# Patient Record
Sex: FEMALE | Race: WHITE | Employment: OTHER | URBAN - METROPOLITAN AREA
[De-identification: names, ages, dates, MRNs, and addresses within clinical notes are randomized per-mention and may not be internally consistent; named-entity substitution may affect disease eponyms.]

---

## 2017-02-23 ENCOUNTER — APPOINTMENT (OUTPATIENT)
Dept: LAB | Age: 72
End: 2017-02-23
Attending: FAMILY MEDICINE
Payer: MEDICARE

## 2017-02-23 DIAGNOSIS — R79.89 LOW SERUM SODIUM: ICD-10-CM

## 2017-02-23 LAB — SODIUM SERPL-SCNC: 134 MMOL/L (ref 136–144)

## 2017-02-23 PROCEDURE — 84295 ASSAY OF SERUM SODIUM: CPT

## 2017-02-23 PROCEDURE — 36415 COLL VENOUS BLD VENIPUNCTURE: CPT

## 2017-02-24 DIAGNOSIS — E87.1 LOW SODIUM LEVELS: Primary | ICD-10-CM

## 2017-05-09 ENCOUNTER — APPOINTMENT (OUTPATIENT)
Dept: LAB | Age: 72
End: 2017-05-09
Attending: FAMILY MEDICINE
Payer: MEDICARE

## 2017-05-09 ENCOUNTER — OFFICE VISIT (OUTPATIENT)
Dept: FAMILY MEDICINE CLINIC | Facility: CLINIC | Age: 72
End: 2017-05-09

## 2017-05-09 VITALS
HEIGHT: 64 IN | BODY MASS INDEX: 32.61 KG/M2 | DIASTOLIC BLOOD PRESSURE: 80 MMHG | HEART RATE: 80 BPM | SYSTOLIC BLOOD PRESSURE: 132 MMHG | RESPIRATION RATE: 16 BRPM | TEMPERATURE: 98 F | WEIGHT: 191 LBS

## 2017-05-09 DIAGNOSIS — I10 BENIGN ESSENTIAL HYPERTENSION: Primary | ICD-10-CM

## 2017-05-09 DIAGNOSIS — E87.1 LOW SODIUM LEVELS: ICD-10-CM

## 2017-05-09 DIAGNOSIS — F43.0 ACUTE STRESS REACTION: ICD-10-CM

## 2017-05-09 DIAGNOSIS — E55.9 VITAMIN D DEFICIENCY: ICD-10-CM

## 2017-05-09 PROCEDURE — 99213 OFFICE O/P EST LOW 20 MIN: CPT | Performed by: FAMILY MEDICINE

## 2017-05-09 PROCEDURE — 36415 COLL VENOUS BLD VENIPUNCTURE: CPT

## 2017-05-09 PROCEDURE — 84295 ASSAY OF SERUM SODIUM: CPT

## 2017-05-09 RX ORDER — ESCITALOPRAM OXALATE 10 MG/1
10 TABLET ORAL
Qty: 90 TABLET | Refills: 1 | Status: SHIPPED | OUTPATIENT
Start: 2017-05-09 | End: 2017-12-11

## 2017-05-09 RX ORDER — HYDROCHLOROTHIAZIDE 25 MG/1
25 TABLET ORAL
Qty: 90 TABLET | Refills: 1 | Status: SHIPPED | OUTPATIENT
Start: 2017-05-09 | End: 2017-10-30

## 2017-05-09 RX ORDER — BISOPROLOL FUMARATE AND HYDROCHLOROTHIAZIDE 6.25; 5 MG/1; MG/1
1 TABLET ORAL
Qty: 90 TABLET | Refills: 1 | Status: SHIPPED | OUTPATIENT
Start: 2017-05-09 | End: 2017-10-30

## 2017-05-09 NOTE — PROGRESS NOTES
CHIEF COMPLAINT:   Monica Camejo a 73year old female is here for followup on HTN  HPI:   Monica Camejo has been doing well since the last visit here. Monica Camejo  is compliant with hypertensive medication. No chest pain. No dyspnea.  No pal elevated blood pressures noted while monitoring at home.

## 2017-06-22 ENCOUNTER — HOSPITAL ENCOUNTER (OUTPATIENT)
Age: 72
Discharge: HOME | End: 2017-06-22
Attending: EMERGENCY MEDICINE
Payer: MEDICARE

## 2017-06-22 VITALS
DIASTOLIC BLOOD PRESSURE: 87 MMHG | SYSTOLIC BLOOD PRESSURE: 140 MMHG | HEART RATE: 68 BPM | RESPIRATION RATE: 18 BRPM | TEMPERATURE: 98 F | OXYGEN SATURATION: 98 %

## 2017-06-22 DIAGNOSIS — M25.511 ACUTE PAIN OF RIGHT SHOULDER: Primary | ICD-10-CM

## 2017-06-22 PROCEDURE — 99203 OFFICE O/P NEW LOW 30 MIN: CPT

## 2017-06-22 PROCEDURE — 99213 OFFICE O/P EST LOW 20 MIN: CPT

## 2017-06-22 RX ORDER — HYDROCODONE BITARTRATE AND ACETAMINOPHEN 5; 325 MG/1; MG/1
1-2 TABLET ORAL EVERY 6 HOURS PRN
Qty: 20 TABLET | Refills: 0 | Status: SHIPPED | OUTPATIENT
Start: 2017-06-22 | End: 2017-07-02

## 2017-06-22 NOTE — ED INITIAL ASSESSMENT (HPI)
Right shoulder discomfort for 3 weeks  Patient states moving furniture and boxes- believes that started the discomfort  Decreased ROM

## 2017-06-22 NOTE — ED PROVIDER NOTES
Patient Seen in: [facility]    History   Patient presents with:  Shoulder Pain    Stated Complaint: r shoulder pain     HPI    Patient complains of shoulder pain.   Shoulder pain is been present for about 3 weeks but became much DAILY   Multiple Vitamin (DAILY MULTIVITAMIN OR),  Take  by mouth. Aspirin 81 MG Oral Tab,  Take 81 mg by mouth daily. Calcium Carbonate-Vitamin D (CALCIUM + D OR),  Take  by mouth.        Family History   Problem Relation Age of Onset   • Other[other] hand is excellent. Sensation intact light touch. Radial pulses are strong. Distal cap refill intact.        ED Course   Labs Reviewed - No data to display    MDM   Patient has significant discomfort with abduction and external rotation of the shoulder so

## 2017-06-26 PROBLEM — M75.81 ROTATOR CUFF TENDONITIS, RIGHT: Status: ACTIVE | Noted: 2017-06-26

## 2017-08-03 ENCOUNTER — TELEPHONE (OUTPATIENT)
Dept: FAMILY MEDICINE CLINIC | Facility: CLINIC | Age: 72
End: 2017-08-03

## 2017-10-05 RX ORDER — BISOPROLOL FUMARATE AND HYDROCHLOROTHIAZIDE 6.25; 5 MG/1; MG/1
TABLET ORAL
Qty: 30 TABLET | Refills: 0 | Status: SHIPPED | OUTPATIENT
Start: 2017-10-05 | End: 2017-10-30

## 2017-10-19 ENCOUNTER — TELEPHONE (OUTPATIENT)
Dept: FAMILY MEDICINE CLINIC | Facility: CLINIC | Age: 72
End: 2017-10-19

## 2017-10-19 NOTE — TELEPHONE ENCOUNTER
Received form from [employer] [facility] requesting PA information for pt's Bisoprolol Fumarate/ hydrochlorothiazide. Form completed and faxed.

## 2017-10-24 NOTE — TELEPHONE ENCOUNTER
Incoming fax received from department of health and Human Services [facility] of Medicaid and [facility] [employer]. Letter of Determination. Medication denied coverage. Formulary alternative is Atenolol -Chlorthalidone.   Patient has upcoming appointment 10/30

## 2017-10-30 ENCOUNTER — OFFICE VISIT (OUTPATIENT)
Dept: FAMILY MEDICINE CLINIC | Facility: CLINIC | Age: 72
End: 2017-10-30

## 2017-10-30 ENCOUNTER — LAB ENCOUNTER (OUTPATIENT)
Dept: LAB | Age: 72
End: 2017-10-30
Attending: FAMILY MEDICINE
Payer: MEDICARE

## 2017-10-30 VITALS
RESPIRATION RATE: 12 BRPM | HEART RATE: 64 BPM | HEIGHT: 64 IN | TEMPERATURE: 99 F | DIASTOLIC BLOOD PRESSURE: 86 MMHG | WEIGHT: 197 LBS | SYSTOLIC BLOOD PRESSURE: 140 MMHG | BODY MASS INDEX: 33.63 KG/M2

## 2017-10-30 DIAGNOSIS — E78.00 HYPERCHOLESTEREMIA: ICD-10-CM

## 2017-10-30 DIAGNOSIS — Z87.448 HISTORY OF HEMATURIA: ICD-10-CM

## 2017-10-30 DIAGNOSIS — I10 BENIGN ESSENTIAL HYPERTENSION: ICD-10-CM

## 2017-10-30 DIAGNOSIS — I83.893 VARICOSE VEINS OF BOTH LEGS WITH EDEMA: ICD-10-CM

## 2017-10-30 DIAGNOSIS — Z12.39 SCREENING FOR MALIGNANT NEOPLASM OF BREAST: ICD-10-CM

## 2017-10-30 DIAGNOSIS — E55.9 VITAMIN D DEFICIENCY: ICD-10-CM

## 2017-10-30 DIAGNOSIS — Z00.00 ENCOUNTER FOR ANNUAL HEALTH EXAMINATION: Primary | ICD-10-CM

## 2017-10-30 DIAGNOSIS — Z13.0 SCREENING FOR IRON DEFICIENCY ANEMIA: ICD-10-CM

## 2017-10-30 PROCEDURE — 80061 LIPID PANEL: CPT

## 2017-10-30 PROCEDURE — 80053 COMPREHEN METABOLIC PANEL: CPT

## 2017-10-30 PROCEDURE — 84443 ASSAY THYROID STIM HORMONE: CPT

## 2017-10-30 PROCEDURE — 99214 OFFICE O/P EST MOD 30 MIN: CPT | Performed by: FAMILY MEDICINE

## 2017-10-30 PROCEDURE — 81001 URINALYSIS AUTO W/SCOPE: CPT

## 2017-10-30 PROCEDURE — 36415 COLL VENOUS BLD VENIPUNCTURE: CPT

## 2017-10-30 PROCEDURE — 85025 COMPLETE CBC W/AUTO DIFF WBC: CPT

## 2017-10-30 PROCEDURE — G0403 EKG FOR INITIAL PREVENT EXAM: HCPCS | Performed by: FAMILY MEDICINE

## 2017-10-30 PROCEDURE — 82306 VITAMIN D 25 HYDROXY: CPT

## 2017-10-30 PROCEDURE — G0402 INITIAL PREVENTIVE EXAM: HCPCS | Performed by: FAMILY MEDICINE

## 2017-10-30 RX ORDER — BISOPROLOL FUMARATE 5 MG/1
5 TABLET ORAL DAILY
Qty: 90 TABLET | Refills: 1 | Status: SHIPPED | OUTPATIENT
Start: 2017-10-30 | End: 2018-04-19

## 2017-10-30 RX ORDER — HYDROCHLOROTHIAZIDE 25 MG/1
25 TABLET ORAL
Qty: 90 TABLET | Refills: 1 | Status: SHIPPED | OUTPATIENT
Start: 2017-10-30 | End: 2017-12-13

## 2017-10-30 NOTE — PATIENT INSTRUCTIONS
Stop the bisoprolol/HCTZ combination - start plain bisoprolol 5mg daily. Continue HCTZ 25mg daily. Follow up with home monitor and readings in 4-6 weeks for recheck of BP.       Adelina Thomas's SCREENING SCHEDULE   Tests on this list are recommended b and non-screening if indicated for medical reasons Electrocardiogram date Routine EKG is not a screening covered service except at the [place] to Medicare Visit    Abdominal aortic aneurysm screening (once between ages 73-68) IPPE only No results found for for this patient. Chlamydia  Annually if high risk No results found for: CHLAMYDIA No flowsheet data found.     Screening Mammogram      Mammogram    Recommend Annually to at least age 76, and as needed after 76 Mammogram,1 Yr due on 11/18/2017 Please g different types of Advance Directives. It also has the State forms available on it's website for anyone to review and print using their home computer and printer. (the forms are also available in [address])  [email or web address]  This link also has informa Date Value   10/22/2013 87   10/04/2011 90     Triglycerides (mg/dL)   Date Value   10/27/2016 98        EKG - covered if needed at Welcome to Medicare, and non-screening if indicated for medical reasons Electrocardiogram date Routine EKG is not a screen age 21-68 or Pap+HPV every 5 yrs age 33-67, Covered every 2 yrs up to age 79 or Yearly if High Risk   There are no preventive care reminders to display for this patient.      Chlamydia  Annually if high risk No results found for: CHLAMYDIA No flowsheet data http://www. idph.state. [place]/public/books/advin.htm  A link to the [employer]. This site has a lot of good information including definitions of the different types of Advance Directives.  It also has the State forms available on it's webs

## 2017-10-30 NOTE — PROGRESS NOTES
HPI:   Erin Mensah is a 81year old female who presents for a Medicare Initial Preventative Physical Exam (Welcome to Medicare- < 12 months on Medicare). Compliant with medications for HTN. Insurance won't cover Bisoprolol/HCTZ combo.  No chest Outpatient Prescriptions Marked as Taking for the 10/30/17 encounter (Office Visit) with Kelvin Guerrero PA-C:  Bisoprolol Fumarate 5 MG Oral Tab Take 1 tablet (5 mg total) by mouth daily.    hydrochlorothiazide 25 MG Oral Tab Take 1 tablet (25 mg history  ALL/ASTHMA: denies hx of allergy or asthma    EXAM:   /86   Pulse 64   Temp 99.4 °F (37.4 °C) (Oral)   Resp 12   Ht 64\"   Wt 197 lb   LMP 09/25/1998   BMI 33.81 kg/m²  Estimated body mass index is 33.81 kg/m² as calculated from the followin bleeding, No axillary or supraclavicular adenopathy      SUGGESTED VACCINATIONS - Influenza, Pneumococcal, Zoster, Tetanus     Immunization History   Administered Date(s) Administered   • HIGH DOSE FLU 65 YRS AND OLDER PRSV FREE SINGLE D (20305) FLU CLINIC does not have a Power of  for [employer] on file in [address] [place] [address] Rd.  The patient has this document but we do not have it in Epic, and patient is instructed to get our office a copy of it for scanning into Epic           PLAN:  The patient indicates [place] 1-Yes    Does pain affect your day to day activities?: 0-No     Have you had any memory issues?: 0-No    Fall/Risk Scorin          Depression Screening (PHQ-2/PHQ-9): Over the LAST 2 WEEKS   Little interest or pleasure in doing things (over the last tw Annually No results found for: FOB No flowsheet data found. Glaucoma Screening      Ophthalmology Visit Annually: Diabetics, FHx Glaucoma, AA>50, > 65 No flowsheet data found.     Bone Density Screening      Dexascan Every two years Last Dexa Sca Persistent     Medications (ACE/ARB, digoxin diuretics, anticonvulsants.)    Potassium  Annually Potassium (mmol/L)   Date Value   10/30/2017 3.9     POTASSIUM (mmol/L)   Date Value   10/22/2013 4.5   09/25/2012 3.8   10/04/2011 3.6    No flowsheet data fo Medicare Patient With [address] for this Visit:  Signed Prescriptions Disp Refills    Bisoprolol Fumarate 5 MG Oral Tab 90 tablet 1      Sig: Take 1 tablet (5 mg total) by mouth daily.       hydrochlorothiazide 25 MG Oral Tab 90 tablet 1      Si

## 2017-11-01 ENCOUNTER — TELEPHONE (OUTPATIENT)
Dept: FAMILY MEDICINE CLINIC | Facility: CLINIC | Age: 72
End: 2017-11-01

## 2017-11-01 DIAGNOSIS — R79.89 ABNORMAL CBC: Primary | ICD-10-CM

## 2017-11-03 ENCOUNTER — MED REC SCAN ONLY (OUTPATIENT)
Dept: FAMILY MEDICINE CLINIC | Facility: CLINIC | Age: 72
End: 2017-11-03

## 2017-11-06 ENCOUNTER — HOSPITAL ENCOUNTER (OUTPATIENT)
Dept: ULTRASOUND IMAGING | Facility: HOSPITAL | Age: 72
Discharge: HOME | End: 2017-11-06
Attending: FAMILY MEDICINE
Payer: MEDICARE

## 2017-11-06 ENCOUNTER — TELEPHONE (OUTPATIENT)
Dept: FAMILY MEDICINE CLINIC | Facility: CLINIC | Age: 72
End: 2017-11-06

## 2017-11-06 DIAGNOSIS — M79.662 PAIN OF LEFT CALF: ICD-10-CM

## 2017-11-06 DIAGNOSIS — M79.662 PAIN OF LEFT CALF: Primary | ICD-10-CM

## 2017-11-06 PROCEDURE — 93971 EXTREMITY STUDY: CPT | Performed by: FAMILY MEDICINE

## 2017-11-06 NOTE — TELEPHONE ENCOUNTER
KURTIS From pt. She saw you on 10/30/2017. During your visit she mentioned left calf and ankle swelling. She has had these symptoms for several years off/on. She was advised to see a vein specialist but they can not see her until 12/04/2017.  Since her sympto

## 2017-11-06 NOTE — TELEPHONE ENCOUNTER
When we spoke about it she indicated it was chronic - her left always worse then the right leg (especially if she has salt the day before).   If she is noticing more swelling than normal for her or if she feels like salt really doesn't make a difference now

## 2017-11-06 NOTE — TELEPHONE ENCOUNTER
T.C. To pt. She did clarify she does have an increase in swelling in her left calf and some pain. S.Dressler advised pt to have a stat Venous. U/S today. appt for today at 4:45 pm. Pt notified and agreed to plan.

## 2017-11-21 ENCOUNTER — HOSPITAL ENCOUNTER (OUTPATIENT)
Dept: MAMMOGRAPHY | Age: 72
Discharge: HOME | End: 2017-11-21
Attending: FAMILY MEDICINE
Payer: MEDICARE

## 2017-11-21 DIAGNOSIS — Z12.39 SCREENING FOR MALIGNANT NEOPLASM OF BREAST: ICD-10-CM

## 2017-11-21 PROCEDURE — 77067 SCR MAMMO BI INCL CAD: CPT | Performed by: FAMILY MEDICINE

## 2017-11-28 ENCOUNTER — TELEPHONE (OUTPATIENT)
Dept: FAMILY MEDICINE CLINIC | Facility: CLINIC | Age: 72
End: 2017-11-28

## 2017-11-28 NOTE — TELEPHONE ENCOUNTER
Left message on the pharmacist voicemail stating to discontinue patient's Bisoprolol/HCTZ 5 mg/6.25 mg refill requests. Informed them that patient is taking them separately now.

## 2017-12-04 PROBLEM — M79.89 RIGHT LEG SWELLING: Status: ACTIVE | Noted: 2017-12-04

## 2017-12-04 PROBLEM — M79.89 LEFT LEG SWELLING: Status: ACTIVE | Noted: 2017-12-04

## 2017-12-04 PROBLEM — I80.02: Status: ACTIVE | Noted: 2017-12-04

## 2017-12-11 ENCOUNTER — PATIENT MESSAGE (OUTPATIENT)
Dept: FAMILY MEDICINE CLINIC | Facility: CLINIC | Age: 72
End: 2017-12-11

## 2017-12-12 NOTE — TELEPHONE ENCOUNTER
From: Deyvi Ku  To: Leonid Carter  Sent: 12/11/2017 1:08 AM CST  Subject: Prescription Question    Hi Aleyda:  Could you please refill my prescription for the escitalopram tabs, 10 mg, as soon as possible?  I went to [facility] [address]

## 2017-12-13 RX ORDER — ESCITALOPRAM OXALATE 10 MG/1
10 TABLET ORAL
Qty: 90 TABLET | Refills: 0 | Status: SHIPPED
Start: 2017-12-13 | End: 2018-03-06

## 2017-12-20 RX ORDER — ESCITALOPRAM OXALATE 10 MG/1
10 TABLET ORAL
Qty: 90 TABLET | Refills: 1
Start: 2017-12-20

## 2017-12-20 RX ORDER — HYDROCHLOROTHIAZIDE 25 MG/1
25 TABLET ORAL
Qty: 90 TABLET | Refills: 1 | Status: SHIPPED
Start: 2017-12-20 | End: 2018-04-19

## 2017-12-20 NOTE — TELEPHONE ENCOUNTER
From: Jose Juan Vargas  Sent: 12/11/2017 1:01 AM CST  Subject: Medication Renewal Request    Jose Juan Vargas would like a refill of the following medications:     escitalopram 10 MG Oral Tab Mae Herrera PA-C]    [facility]

## 2017-12-20 NOTE — TELEPHONE ENCOUNTER
From: Erin Mensah  Sent: 12/13/2017 6:17 PM CST  Subject: Medication Renewal Request    Erin Mensah would like a refill of the following medications:     hydrochlorothiazide 25 MG Oral Tab Nicole Mosqueda PA-C]    Preferred pharmacy: [facility]

## 2017-12-20 NOTE — TELEPHONE ENCOUNTER
From: Lanice Check  Sent: 12/13/2017 10:41 AM CST  Subject: Medication Renewal Request    Lanice Check would like a refill of the following medications:     escitalopram 10 MG Oral Tab Arch Sacks, PA-C]    Preferred pharmacy: [facility]

## 2018-03-08 RX ORDER — ESCITALOPRAM OXALATE 10 MG/1
TABLET ORAL
Qty: 90 TABLET | Refills: 0 | Status: SHIPPED | OUTPATIENT
Start: 2018-03-08 | End: 2018-04-19

## 2018-03-09 RX ORDER — ESCITALOPRAM OXALATE 10 MG/1
10 TABLET ORAL
Qty: 90 TABLET | Refills: 0
Start: 2018-03-09

## 2018-03-09 NOTE — TELEPHONE ENCOUNTER
From: Milton Hinojosa  Sent: 3/3/2018 6:53 PM CST  Subject: Medication Renewal Request    Milton Hinojosa would like a refill of the following medications:     escitalopram 10 MG Oral Tab Darion Hartley PA-C]    [facility]

## 2018-04-19 ENCOUNTER — LAB ENCOUNTER (OUTPATIENT)
Dept: LAB | Age: 73
End: 2018-04-19
Attending: FAMILY MEDICINE
Payer: MEDICARE

## 2018-04-19 DIAGNOSIS — R79.89 ABNORMAL CBC: ICD-10-CM

## 2018-04-19 PROBLEM — M79.89 LEFT LEG SWELLING: Status: RESOLVED | Noted: 2017-12-04 | Resolved: 2018-04-19

## 2018-04-19 PROBLEM — M79.89 RIGHT LEG SWELLING: Status: RESOLVED | Noted: 2017-12-04 | Resolved: 2018-04-19

## 2018-04-19 PROCEDURE — 36415 COLL VENOUS BLD VENIPUNCTURE: CPT

## 2018-04-19 PROCEDURE — 85025 COMPLETE CBC W/AUTO DIFF WBC: CPT

## 2018-04-19 NOTE — PROGRESS NOTES
CHIEF COMPLAINT:   Az Olivares a 81year old female is here for followup on HTN and stress reaction  HPI:   Az Olivares has been doing well since the last visit here. Az Olivares  is compliant with hypertensive medication.   No chest pain total) by mouth daily. Discussed weaning of lexapro - ok to try. Continue current management. Continue to monitor blood pressure. Continue regular exercise. Watch salt intake. Followup in 6 months.   Sooner if any elevated blood pressures not

## 2018-04-19 NOTE — PATIENT INSTRUCTIONS
Debrox for 4 days prior to your next appointment. Bring your blood pressure monitor to next appointment.

## 2018-05-01 ENCOUNTER — OFFICE VISIT (OUTPATIENT)
Dept: FAMILY MEDICINE CLINIC | Facility: CLINIC | Age: 73
End: 2018-05-01

## 2018-05-01 VITALS
SYSTOLIC BLOOD PRESSURE: 110 MMHG | RESPIRATION RATE: 12 BRPM | DIASTOLIC BLOOD PRESSURE: 70 MMHG | HEART RATE: 72 BPM | TEMPERATURE: 98 F

## 2018-05-01 DIAGNOSIS — Z09 RESOLVED CONDITION, FOLLOW-UP: ICD-10-CM

## 2018-05-01 DIAGNOSIS — R09.82 POST-NASAL DRIP: Primary | ICD-10-CM

## 2018-05-01 PROCEDURE — 99213 OFFICE O/P EST LOW 20 MIN: CPT | Performed by: FAMILY MEDICINE

## 2018-05-01 NOTE — PROGRESS NOTES
Alma Willett is a 81year old female. CHIEF COMPLAINT   Check ear and also PND. HPI:   PND, chronic nasal drainage, sore throat off and on. No med tried. Follow up on ear - check right ear - used Debrox.      Current Outpatient Prescriptions:  G denies any unusual skin lesions or rashes  RESPIRATORY: denies shortness of breath with exertion  CARDIOVASCULAR: denies chest pain on exertion  GI: denies abdominal pain and denies heartburn  NEURO: denies headaches    EXAM:   /70   Pulse 72   Temp

## 2018-05-01 NOTE — PATIENT INSTRUCTIONS
Claritin (loratadine) 10mg once daily OR  Allegra (fexofenadine) 180mg once daily OR Zyrtec (cetirizine) 10mg once daily. Claritin is the weakest.  Zyrtec is the strongest.  Zyrtec in some patients will cause mild sedation.   Nothing with a D on the end of

## 2018-08-03 ENCOUNTER — TELEPHONE (OUTPATIENT)
Dept: FAMILY MEDICINE CLINIC | Facility: CLINIC | Age: 73
End: 2018-08-03

## 2018-09-10 ENCOUNTER — CHARTING TRANS (OUTPATIENT)
Dept: OTHER | Age: 73
End: 2018-09-10

## 2018-10-11 RX ORDER — BISOPROLOL FUMARATE 5 MG/1
TABLET ORAL
Qty: 90 TABLET | Refills: 0 | Status: SHIPPED | OUTPATIENT
Start: 2018-10-11 | End: 2018-11-01

## 2018-11-01 ENCOUNTER — OFFICE VISIT (OUTPATIENT)
Dept: FAMILY MEDICINE CLINIC | Facility: CLINIC | Age: 73
End: 2018-11-01
Payer: MEDICARE

## 2018-11-01 ENCOUNTER — LAB ENCOUNTER (OUTPATIENT)
Dept: LAB | Age: 73
End: 2018-11-01
Attending: FAMILY MEDICINE
Payer: MEDICARE

## 2018-11-01 VITALS
WEIGHT: 197 LBS | TEMPERATURE: 98 F | HEART RATE: 64 BPM | SYSTOLIC BLOOD PRESSURE: 134 MMHG | BODY MASS INDEX: 33.63 KG/M2 | DIASTOLIC BLOOD PRESSURE: 80 MMHG | HEIGHT: 64 IN | RESPIRATION RATE: 16 BRPM

## 2018-11-01 DIAGNOSIS — Z12.39 SCREENING FOR MALIGNANT NEOPLASM OF BREAST: Primary | ICD-10-CM

## 2018-11-01 DIAGNOSIS — I10 BENIGN ESSENTIAL HYPERTENSION: ICD-10-CM

## 2018-11-01 DIAGNOSIS — Z12.4 PAP SMEAR FOR CERVICAL CANCER SCREENING: ICD-10-CM

## 2018-11-01 DIAGNOSIS — E55.9 VITAMIN D DEFICIENCY: ICD-10-CM

## 2018-11-01 DIAGNOSIS — R73.9 HYPERGLYCEMIA: ICD-10-CM

## 2018-11-01 DIAGNOSIS — Z01.419 ENCOUNTER FOR GYNECOLOGICAL EXAMINATION WITHOUT ABNORMAL FINDING: ICD-10-CM

## 2018-11-01 DIAGNOSIS — R77.1 HYPERGLOBULINEMIA: ICD-10-CM

## 2018-11-01 DIAGNOSIS — F41.1 ANXIETY STATE: ICD-10-CM

## 2018-11-01 DIAGNOSIS — E78.00 HYPERCHOLESTEREMIA: ICD-10-CM

## 2018-11-01 DIAGNOSIS — Z00.00 ENCOUNTER FOR ANNUAL HEALTH EXAMINATION: ICD-10-CM

## 2018-11-01 DIAGNOSIS — F32.A DEPRESSION, UNSPECIFIED DEPRESSION TYPE: ICD-10-CM

## 2018-11-01 PROCEDURE — 80061 LIPID PANEL: CPT

## 2018-11-01 PROCEDURE — 82306 VITAMIN D 25 HYDROXY: CPT

## 2018-11-01 PROCEDURE — G0438 PPPS, INITIAL VISIT: HCPCS | Performed by: FAMILY MEDICINE

## 2018-11-01 PROCEDURE — 80053 COMPREHEN METABOLIC PANEL: CPT

## 2018-11-01 PROCEDURE — 99214 OFFICE O/P EST MOD 30 MIN: CPT | Performed by: FAMILY MEDICINE

## 2018-11-01 PROCEDURE — 85025 COMPLETE CBC W/AUTO DIFF WBC: CPT

## 2018-11-01 PROCEDURE — 83036 HEMOGLOBIN GLYCOSYLATED A1C: CPT

## 2018-11-01 PROCEDURE — 88175 CYTOPATH C/V AUTO FLUID REDO: CPT | Performed by: FAMILY MEDICINE

## 2018-11-01 PROCEDURE — 81003 URINALYSIS AUTO W/O SCOPE: CPT

## 2018-11-01 PROCEDURE — G0101 CA SCREEN;PELVIC/BREAST EXAM: HCPCS | Performed by: FAMILY MEDICINE

## 2018-11-01 PROCEDURE — 36415 COLL VENOUS BLD VENIPUNCTURE: CPT

## 2018-11-01 RX ORDER — BISOPROLOL FUMARATE 5 MG/1
TABLET ORAL
Qty: 90 TABLET | Refills: 1 | Status: SHIPPED | OUTPATIENT
Start: 2018-11-01 | End: 2019-05-09

## 2018-11-01 RX ORDER — ESCITALOPRAM OXALATE 10 MG/1
TABLET ORAL
Qty: 90 TABLET | Refills: 1 | Status: SHIPPED | OUTPATIENT
Start: 2018-11-01 | End: 2019-05-09

## 2018-11-01 RX ORDER — HYDROCHLOROTHIAZIDE 25 MG/1
25 TABLET ORAL
Qty: 90 TABLET | Refills: 1 | Status: SHIPPED | OUTPATIENT
Start: 2018-11-01 | End: 2019-05-31

## 2018-11-01 NOTE — PATIENT INSTRUCTIONS
Adelina Thomas's SCREENING SCHEDULE   Tests on this list are recommended by your physician but may not be covered, or covered at this frequency, by your insurer. Please check with your insurance carrier before scheduling to verify coverage.    Lupillo Duque screening (once between ages 73-68) IPPE only No results found for this or any previous visit.  Limited to patients who meet one of the following criteria:   • Men who are 73-68 years old and have smoked more than 100 cigarettes in their lifetime   • Anyone needed after 75 Mammogram due on 11/21/2018 Please get this Mammogram regularly   Immunizations      Influenza  Covered Annually Orders placed or performed in visit on 10/27/16   • FLU VACC PRSV FREE INC ANTIG    Please get every year    Pneumococcal 13 (P Prydeinig)  [email or web address]  This link also has information from the [address] [place] [address] regarding Advance Directives.   Adelina Thomas's SCREENING SCHEDULE   Tests on this list are recommended by your physician but may not be covered, or Electrocardiogram date10/30/2017 Routine EKG is not a screening covered service except at the Welcome to Medicare Visit    Abdominal aortic aneurysm screening (once between ages 73-68) IPPE only No results found for this or any previous visit.  Limited to p risk No results found for: CHLAMYDIA No flowsheet data found.     Screening Mammogram      Mammogram    Recommend Annually to at least age 76, and as needed after 76 Mammogram due on 11/21/2018 Please get this Mammogram regularly   Immunizations      [place] State forms available on it's website for anyone to review and print using their home computer and printer. (the forms are also available in [address])  [email or web address]  This link also has information from the [address] regarding A

## 2018-11-01 NOTE — PROGRESS NOTES
HPI:   Mark Rodriguez is a 72year old female who presents for a Medicare Subsequent Annual Wellness visit (Pt already had Initial Annual Wellness). Compliant with medication for hypertension. No side effects with medication. No chest pain.   No dys with patient and Family/surrogate (if present), and forms available to patient in AVS         She has never smoked tobacco.    CAGE Alcohol screening   Arun Moore was screened for Alcohol abuse and had a score of 0 so is at low risk.     Patient Care Misc Natural Products (TURMERIC CURCUMIN) Oral Cap    Glucosamine-Chondroitin (OSTEO BI-FLEX REGULAR STRENGTH OR) Take by mouth. Coenzyme Q10 (CO Q 10 OR) Take by mouth.   vitamin E 100 UNITS Oral Cap Take 100 Units by mouth daily.    Vitamin B-12 250 M TM's and external ear canals, both ears   Nose: Nares normal, septum midline,mucosa normal, no drainage or sinus tenderness   Throat: Lips, mucosa, and tongue normal; teeth and gums normal   Neck: Supple, symmetrical, trachea midline, no adenopathy;  thyro Diagnoses and all orders for this visit:    Screening for malignant neoplasm of breast  -     JONATHAN SCREENING BILAT (CPT=77067);  Future    Encounter for annual health examination    Encounter for gynecological examination without abnormal finding  -     BR Procedure   Diabetes Screening      HbgA1C   Annually No results found for: A1C No flowsheet data found.     Fasting Blood Sugar (FSB)Annually Glucose (mg/dL)   Date Value   11/01/2018 105 (H)     GLUCOSE (mg/dL)   Date Value   10/22/2013 80   10/04/2011 89 (Pneumovax)  Covered Once after 65 10/22/2013 Please get once after your 65th birthday    Hepatitis B for Moderate/High Risk No vaccine history found Medium/high risk factors:   End-stage renal disease   Hemophiliacs who received Factor VIII or IX concentr management.     Orders Placed This Encounter      CBC W Differential W Platelet [E]      Vitamin D, 25-Hydroxy      LIPID PANEL      CMP      UA/M WITH CULTURE REFLEX [3020]      Breast and Pelvic Exam, Covered every 2 years []      Obtain Pap Smear, L

## 2018-11-02 DIAGNOSIS — R77.1 HYPERGLOBULINEMIA: Primary | ICD-10-CM

## 2018-11-02 DIAGNOSIS — R73.9 HYPERGLYCEMIA: ICD-10-CM

## 2018-11-23 ENCOUNTER — HOSPITAL ENCOUNTER (OUTPATIENT)
Dept: MAMMOGRAPHY | Age: 73
End: 2018-11-23
Attending: FAMILY MEDICINE
Payer: MEDICARE

## 2018-11-23 ENCOUNTER — HOSPITAL ENCOUNTER (OUTPATIENT)
Dept: MAMMOGRAPHY | Age: 73
Discharge: HOME | End: 2018-11-23
Attending: FAMILY MEDICINE
Payer: MEDICARE

## 2018-11-23 DIAGNOSIS — Z12.39 SCREENING FOR MALIGNANT NEOPLASM OF BREAST: ICD-10-CM

## 2018-11-23 PROCEDURE — 77067 SCR MAMMO BI INCL CAD: CPT | Performed by: FAMILY MEDICINE

## 2018-11-23 PROCEDURE — 77063 BREAST TOMOSYNTHESIS BI: CPT | Performed by: FAMILY MEDICINE

## 2018-12-08 VITALS — TEMPERATURE: 98.2 F

## 2019-05-09 RX ORDER — BISOPROLOL FUMARATE 5 MG/1
TABLET ORAL
Qty: 90 TABLET | Refills: 0 | Status: SHIPPED | OUTPATIENT
Start: 2019-05-09 | End: 2019-05-31

## 2019-05-09 RX ORDER — ESCITALOPRAM OXALATE 10 MG/1
TABLET ORAL
Qty: 90 TABLET | Refills: 0 | Status: SHIPPED | OUTPATIENT
Start: 2019-05-09 | End: 2019-05-31

## 2019-05-09 NOTE — TELEPHONE ENCOUNTER
Not protocol medication. LOV :11/01/18 medicare annual   Last labs done :11/01/18  Next appointment :5/31/19  Please see pending medication. Refill if appropriate.    Last refill:    Date:11/01/18  Amount :90 tablets 1 refill   Medication: escitalopram 10

## 2019-05-09 NOTE — TELEPHONE ENCOUNTER
Please call patient is due for hypertension and depression/anxiety med check with S.Dressler. LOV11/01/18 medicare annual.    Thanks!

## 2019-05-15 ENCOUNTER — TELEPHONE (OUTPATIENT)
Dept: FAMILY MEDICINE CLINIC | Facility: CLINIC | Age: 74
End: 2019-05-15

## 2019-05-31 ENCOUNTER — OFFICE VISIT (OUTPATIENT)
Dept: FAMILY MEDICINE CLINIC | Facility: CLINIC | Age: 74
End: 2019-05-31
Payer: MEDICARE

## 2019-05-31 VITALS
WEIGHT: 174 LBS | RESPIRATION RATE: 12 BRPM | DIASTOLIC BLOOD PRESSURE: 78 MMHG | TEMPERATURE: 97 F | HEART RATE: 60 BPM | HEIGHT: 64 IN | BODY MASS INDEX: 29.71 KG/M2 | SYSTOLIC BLOOD PRESSURE: 132 MMHG

## 2019-05-31 DIAGNOSIS — I10 BENIGN ESSENTIAL HYPERTENSION: Primary | ICD-10-CM

## 2019-05-31 DIAGNOSIS — F41.1 ANXIETY STATE: ICD-10-CM

## 2019-05-31 PROCEDURE — 99214 OFFICE O/P EST MOD 30 MIN: CPT | Performed by: FAMILY MEDICINE

## 2019-05-31 RX ORDER — HYDROCHLOROTHIAZIDE 25 MG/1
25 TABLET ORAL
Qty: 90 TABLET | Refills: 1 | Status: SHIPPED | OUTPATIENT
Start: 2019-05-31 | End: 2019-11-04

## 2019-05-31 RX ORDER — ESCITALOPRAM OXALATE 10 MG/1
TABLET ORAL
Qty: 90 TABLET | Refills: 1 | Status: SHIPPED | OUTPATIENT
Start: 2019-05-31 | End: 2019-11-04

## 2019-05-31 RX ORDER — BISOPROLOL FUMARATE 5 MG/1
TABLET ORAL
Qty: 90 TABLET | Refills: 1 | Status: SHIPPED | OUTPATIENT
Start: 2019-05-31 | End: 2019-11-04

## 2019-05-31 NOTE — PROGRESS NOTES
CHIEF COMPLAINT:   Zbigniew Huang a 72year old female is here for followup on HTN  HPI:   Zbigniew Huang has been doing well since the last visit here. Zbigniew Huang  is compliant with hypertensive medication. No chest pain. No dyspnea.  No pal 5 MG Oral Tab 90 tablet 1     Si po daily   • hydrochlorothiazide 25 MG Oral Tab 90 tablet 1     Sig: Take 1 tablet (25 mg total) by mouth once daily. • escitalopram 10 MG Oral Tab 90 tablet 1     Si po daily    Continue current management.   Cont

## 2019-11-04 ENCOUNTER — LAB ENCOUNTER (OUTPATIENT)
Dept: LAB | Age: 74
End: 2019-11-04
Attending: FAMILY MEDICINE
Payer: MEDICARE

## 2019-11-04 ENCOUNTER — OFFICE VISIT (OUTPATIENT)
Dept: FAMILY MEDICINE CLINIC | Facility: CLINIC | Age: 74
End: 2019-11-04
Payer: MEDICARE

## 2019-11-04 VITALS
DIASTOLIC BLOOD PRESSURE: 70 MMHG | HEIGHT: 63.75 IN | TEMPERATURE: 98 F | WEIGHT: 164 LBS | HEART RATE: 56 BPM | RESPIRATION RATE: 12 BRPM | BODY MASS INDEX: 28.35 KG/M2 | SYSTOLIC BLOOD PRESSURE: 120 MMHG

## 2019-11-04 DIAGNOSIS — Z12.31 ENCOUNTER FOR SCREENING MAMMOGRAM FOR BREAST CANCER: ICD-10-CM

## 2019-11-04 DIAGNOSIS — Z00.00 ENCOUNTER FOR ANNUAL HEALTH EXAMINATION: Primary | ICD-10-CM

## 2019-11-04 DIAGNOSIS — E55.9 VITAMIN D DEFICIENCY: ICD-10-CM

## 2019-11-04 DIAGNOSIS — F41.1 ANXIETY STATE: ICD-10-CM

## 2019-11-04 DIAGNOSIS — I10 BENIGN ESSENTIAL HYPERTENSION: ICD-10-CM

## 2019-11-04 DIAGNOSIS — R73.09 ELEVATED GLUCOSE: ICD-10-CM

## 2019-11-04 DIAGNOSIS — M85.80 OSTEOPENIA, UNSPECIFIED LOCATION: ICD-10-CM

## 2019-11-04 PROCEDURE — 83036 HEMOGLOBIN GLYCOSYLATED A1C: CPT

## 2019-11-04 PROCEDURE — 99213 OFFICE O/P EST LOW 20 MIN: CPT | Performed by: FAMILY MEDICINE

## 2019-11-04 PROCEDURE — 82306 VITAMIN D 25 HYDROXY: CPT

## 2019-11-04 PROCEDURE — G0439 PPPS, SUBSEQ VISIT: HCPCS | Performed by: FAMILY MEDICINE

## 2019-11-04 PROCEDURE — 36415 COLL VENOUS BLD VENIPUNCTURE: CPT

## 2019-11-04 PROCEDURE — 85025 COMPLETE CBC W/AUTO DIFF WBC: CPT

## 2019-11-04 PROCEDURE — 81001 URINALYSIS AUTO W/SCOPE: CPT

## 2019-11-04 PROCEDURE — 80053 COMPREHEN METABOLIC PANEL: CPT

## 2019-11-04 RX ORDER — BISOPROLOL FUMARATE 5 MG/1
TABLET ORAL
Qty: 90 TABLET | Refills: 1 | Status: SHIPPED | OUTPATIENT
Start: 2019-11-04 | End: 2020-04-13

## 2019-11-04 RX ORDER — HYDROCHLOROTHIAZIDE 25 MG/1
25 TABLET ORAL
Qty: 90 TABLET | Refills: 1 | Status: SHIPPED | OUTPATIENT
Start: 2019-11-04 | End: 2020-08-17

## 2019-11-04 RX ORDER — ESCITALOPRAM OXALATE 10 MG/1
TABLET ORAL
Qty: 90 TABLET | Refills: 1 | Status: SHIPPED | OUTPATIENT
Start: 2019-11-04 | End: 2020-05-03

## 2019-11-04 NOTE — PATIENT INSTRUCTIONS
Adelina Thomas's SCREENING SCHEDULE   Tests on this list are recommended by your physician but may not be covered, or covered at this frequency, by your insurer. Please check with your insurance carrier before scheduling to verify coverage.    Carol Ann Mcwilliams Abdominal aortic aneurysm screening (once between ages 73-68) IPPE only No results found for this or any previous visit.  Limited to patients who meet one of the following criteria:   • Men who are 73-68 years old and have smoked more than 100 cigarettes in at least age 76, and as needed after 76 Mammogram due on 11/23/2019 Please get this Mammogram regularly   Immunizations      Influenza  Covered Annually Orders placed or performed in visit on 10/27/16   • FLU VACC PRSV FREE INC ANTIG    Please get every ye available in [address])  [email or web address]  This link also has information from the [address] [place] [address] regarding Advance Directives.

## 2019-11-04 NOTE — PROGRESS NOTES
HPI:   Karina Watt is a 69year old female who presents for a subsequent medicare annual.  Compliant with medications for HTN. Insurance won't cover Bisoprolol/HCTZ combo. No chest pain. No dyspnea. Tolerating well.   Due for labs and mammogram. BP Ac-Ca Fructo (MOVE FREE JOINT HEALTH ADVANCE) Oral Tab,   Misc Natural Products (TURMERIC CURCUMIN) Oral Cap,   Coenzyme Q10 (CO Q 10 OR), Take by mouth.  vitamin E 100 UNITS Oral Cap, Take 100 Units by mouth daily.   Vitamin B-12 250 MCG Oral Tab, Take 250 Resp 12   Ht 63.75\"   Wt 164 lb (74.4 kg)   LMP 09/25/1998   BMI 28.37 kg/m²  Estimated body mass index is 28.37 kg/m² as calculated from the following:    Height as of this encounter: 63.75\". Weight as of this encounter: 164 lb (74.4 kg).     Haritha Kaplan 10/06/2015   • Influenza Vaccine, High Dose, Preserv Free 09/30/2017   • Kenalog Per 10mg Inj 05/31/2016, 02/06/2017, 06/26/2017   • Pneumococcal (Prevnar 13) 10/23/2015   • Pneumovax 23 10/22/2013       ASSESSMENT AND OTHER RELEVANT CHRONIC CONDITIONS: follow-ups on file.      Leland Cortez PA-C, 10/30/2017     General Health     In the past six months, have you lost more than 10 pounds without trying?: 2 - No    Has your appetite been poor?: No    How does the patient maintain a good energy level?: in doing things (over the last two weeks)?: Not at all    Feeling down, depressed, or hopeless (over the last two weeks)?: Not at all    PHQ-2 SCORE: 0        Advance Directives     Do you have a healthcare power of ?: Yes    Do you have a living w Screening      Dexascan Every two years Last Dexa Scan:   XR DEXA BONE DENSITOMETRY (CPT=77080) 11/18/2016    No flowsheet data found.     Pap and Pelvic      Pap: Every 3 yrs age 21-65 or Pap+HPV every 5 yrs age 33-67, age 72 and older at high risk There a 10/04/2011 3.6    No flowsheet data found. Creatinine  Annually CREATININE (mg/dL)   Date Value   10/22/2013 0.63   10/04/2011 0.88     Creatinine (mg/dL)   Date Value   11/01/2018 0.78    No flowsheet data found.     BUN  Annually BUN (mg/dL)   Date V

## 2019-11-05 ENCOUNTER — PATIENT MESSAGE (OUTPATIENT)
Dept: FAMILY MEDICINE CLINIC | Facility: CLINIC | Age: 74
End: 2019-11-05

## 2019-11-05 RX ORDER — BISOPROLOL FUMARATE 5 MG/1
TABLET ORAL
Qty: 90 TABLET | Refills: 0 | Status: SHIPPED | OUTPATIENT
Start: 2019-11-05 | End: 2020-01-20

## 2019-11-05 RX ORDER — HYDROCHLOROTHIAZIDE 25 MG/1
TABLET ORAL
Qty: 90 TABLET | Refills: 0 | Status: SHIPPED | OUTPATIENT
Start: 2019-11-05 | End: 2020-06-29

## 2019-11-07 NOTE — TELEPHONE ENCOUNTER
Pt called back after receiving a call from us. I explained that we called about her script for     Escitalopram and that it was sent to her local pharmacy on file. I let her know to give us a call if she has any issue at the pharmacy.

## 2019-11-08 ENCOUNTER — PATIENT MESSAGE (OUTPATIENT)
Dept: FAMILY MEDICINE CLINIC | Facility: CLINIC | Age: 74
End: 2019-11-08

## 2019-11-19 ENCOUNTER — HOSPITAL ENCOUNTER (OUTPATIENT)
Dept: MAMMOGRAPHY | Age: 74
Discharge: HOME | End: 2019-11-19
Attending: FAMILY MEDICINE
Payer: MEDICARE

## 2019-11-19 DIAGNOSIS — Z12.31 ENCOUNTER FOR SCREENING MAMMOGRAM FOR BREAST CANCER: ICD-10-CM

## 2019-11-19 PROCEDURE — 77067 SCR MAMMO BI INCL CAD: CPT | Performed by: FAMILY MEDICINE

## 2019-11-19 PROCEDURE — 77063 BREAST TOMOSYNTHESIS BI: CPT | Performed by: FAMILY MEDICINE

## 2020-01-20 ENCOUNTER — TELEPHONE (OUTPATIENT)
Dept: FAMILY MEDICINE CLINIC | Facility: CLINIC | Age: 75
End: 2020-01-20

## 2020-01-20 NOTE — TELEPHONE ENCOUNTER
Pt is requesting is requesting a refill for     Bisoprolol Fumarate 5 MG Oral Tab 90 tablet 0 11/5/2019    Sig: Stephany Shaffers 1 TABLET BY MOUTH DAILY       She states she only has 3 pills left and believes it was supposed to have refills sent over at her last off

## 2020-01-20 NOTE — TELEPHONE ENCOUNTER
Record shows this med last ordered 11/4/19 #90 w RF x1 and 11/5 order for #90 no RF  Last OV/annual exam 11/4/19 w recommendation to follow up in 6 months.    Call to [facility]-confirmed 11/4/19 has not been filled yet-advised to go ahead and fill that orde

## 2020-04-13 RX ORDER — BISOPROLOL FUMARATE 5 MG/1
TABLET ORAL
Qty: 90 TABLET | Refills: 0 | Status: SHIPPED | OUTPATIENT
Start: 2020-04-13 | End: 2020-06-29

## 2020-05-03 RX ORDER — ESCITALOPRAM OXALATE 10 MG/1
TABLET ORAL
Qty: 90 TABLET | Refills: 1 | Status: SHIPPED | OUTPATIENT
Start: 2020-05-03 | End: 2020-06-29

## 2020-06-29 ENCOUNTER — OFFICE VISIT (OUTPATIENT)
Dept: FAMILY MEDICINE CLINIC | Facility: CLINIC | Age: 75
End: 2020-06-29
Payer: MEDICARE

## 2020-06-29 VITALS
BODY MASS INDEX: 29.73 KG/M2 | DIASTOLIC BLOOD PRESSURE: 80 MMHG | HEIGHT: 63.75 IN | HEART RATE: 60 BPM | SYSTOLIC BLOOD PRESSURE: 126 MMHG | RESPIRATION RATE: 12 BRPM | TEMPERATURE: 98 F | WEIGHT: 172 LBS

## 2020-06-29 DIAGNOSIS — F41.1 ANXIETY STATE: ICD-10-CM

## 2020-06-29 DIAGNOSIS — R60.9 EDEMA, UNSPECIFIED TYPE: ICD-10-CM

## 2020-06-29 DIAGNOSIS — F43.0 ACUTE STRESS REACTION: ICD-10-CM

## 2020-06-29 DIAGNOSIS — I10 BENIGN ESSENTIAL HYPERTENSION: Primary | ICD-10-CM

## 2020-06-29 PROCEDURE — 99214 OFFICE O/P EST MOD 30 MIN: CPT | Performed by: FAMILY MEDICINE

## 2020-06-29 RX ORDER — ESCITALOPRAM OXALATE 10 MG/1
TABLET ORAL
Qty: 90 TABLET | Refills: 1 | Status: SHIPPED | OUTPATIENT
Start: 2020-06-29 | End: 2021-01-05

## 2020-06-29 RX ORDER — HYDROCHLOROTHIAZIDE 25 MG/1
25 TABLET ORAL DAILY
Qty: 90 TABLET | Refills: 1 | Status: SHIPPED | OUTPATIENT
Start: 2020-06-29 | End: 2021-01-05

## 2020-06-29 RX ORDER — BISOPROLOL FUMARATE 5 MG/1
TABLET ORAL
Qty: 90 TABLET | Refills: 1 | Status: SHIPPED | OUTPATIENT
Start: 2020-06-29 | End: 2021-03-29

## 2020-06-29 RX ORDER — FUROSEMIDE 20 MG/1
TABLET ORAL
Qty: 30 TABLET | Refills: 0 | Status: SHIPPED | OUTPATIENT
Start: 2020-06-29 | End: 2020-07-27

## 2020-06-29 NOTE — PROGRESS NOTES
CHIEF COMPLAINT:   Rosa Nieto a 69year old female is here for followup on HTN  HPI:   Rosa Nieto has been doing well since the last visit here. Rosa Nieto  is compliant with hypertensive medication. No chest pain. No dyspnea.  No pal CPM.  Edema, unspecified type: Advised patient to take Lasix 20 mg once daily in the morning as needed for swelling and to hold hydrochlorothiazide on the days that she takes those. Make sure to have potassium in her diet.     No orders of the defined type

## 2020-07-20 ENCOUNTER — TELEPHONE (OUTPATIENT)
Dept: FAMILY MEDICINE CLINIC | Facility: CLINIC | Age: 75
End: 2020-07-20

## 2020-07-20 NOTE — TELEPHONE ENCOUNTER
Received refill for HCTZ 25mg tabs from [facility], which was not listed on pt's chart.   Called to pt to confirm the change in pharmacy, she voiced understanding and agreed that she is now going to use [facility]

## 2020-07-27 RX ORDER — FUROSEMIDE 20 MG/1
TABLET ORAL
Qty: 30 TABLET | Refills: 0 | Status: SHIPPED | OUTPATIENT
Start: 2020-07-27 | End: 2020-08-23

## 2020-08-17 RX ORDER — HYDROCHLOROTHIAZIDE 25 MG/1
25 TABLET ORAL
Qty: 90 TABLET | Refills: 0 | Status: SHIPPED | OUTPATIENT
Start: 2020-08-17 | End: 2020-11-05

## 2020-08-23 RX ORDER — FUROSEMIDE 20 MG/1
TABLET ORAL
Qty: 30 TABLET | Refills: 0 | Status: SHIPPED | OUTPATIENT
Start: 2020-08-23 | End: 2020-09-22

## 2020-08-25 ENCOUNTER — TELEPHONE (OUTPATIENT)
Dept: FAMILY MEDICINE CLINIC | Facility: CLINIC | Age: 75
End: 2020-08-25

## 2020-08-25 DIAGNOSIS — E78.00 HYPERCHOLESTEREMIA: ICD-10-CM

## 2020-08-25 DIAGNOSIS — R73.09 ELEVATED GLUCOSE: ICD-10-CM

## 2020-08-25 DIAGNOSIS — I10 BENIGN ESSENTIAL HYPERTENSION: Primary | ICD-10-CM

## 2020-09-22 RX ORDER — FUROSEMIDE 20 MG/1
TABLET ORAL
Qty: 30 TABLET | Refills: 0 | Status: SHIPPED | OUTPATIENT
Start: 2020-09-22 | End: 2020-10-22

## 2020-10-22 RX ORDER — FUROSEMIDE 20 MG/1
TABLET ORAL
Qty: 90 TABLET | Refills: 0 | Status: SHIPPED | OUTPATIENT
Start: 2020-10-22 | End: 2020-10-26

## 2020-10-26 RX ORDER — FUROSEMIDE 20 MG/1
TABLET ORAL
Qty: 90 TABLET | Refills: 0 | Status: SHIPPED | OUTPATIENT
Start: 2020-10-26 | End: 2021-11-04

## 2020-10-27 ENCOUNTER — LAB ENCOUNTER (OUTPATIENT)
Dept: LAB | Age: 75
End: 2020-10-27
Attending: FAMILY MEDICINE
Payer: MEDICARE

## 2020-10-27 DIAGNOSIS — E78.00 HYPERCHOLESTEREMIA: ICD-10-CM

## 2020-10-27 DIAGNOSIS — R73.09 ELEVATED GLUCOSE: ICD-10-CM

## 2020-10-27 DIAGNOSIS — I10 BENIGN ESSENTIAL HYPERTENSION: ICD-10-CM

## 2020-10-27 PROCEDURE — 83036 HEMOGLOBIN GLYCOSYLATED A1C: CPT

## 2020-10-27 PROCEDURE — 80053 COMPREHEN METABOLIC PANEL: CPT

## 2020-10-27 PROCEDURE — 36415 COLL VENOUS BLD VENIPUNCTURE: CPT

## 2020-10-27 PROCEDURE — 80061 LIPID PANEL: CPT

## 2020-11-05 ENCOUNTER — OFFICE VISIT (OUTPATIENT)
Dept: FAMILY MEDICINE CLINIC | Facility: CLINIC | Age: 75
End: 2020-11-05
Payer: MEDICARE

## 2020-11-05 VITALS
DIASTOLIC BLOOD PRESSURE: 70 MMHG | BODY MASS INDEX: 30.69 KG/M2 | HEIGHT: 64.5 IN | WEIGHT: 182 LBS | SYSTOLIC BLOOD PRESSURE: 112 MMHG

## 2020-11-05 DIAGNOSIS — E78.00 HYPERCHOLESTEREMIA: ICD-10-CM

## 2020-11-05 DIAGNOSIS — Z00.00 ENCOUNTER FOR ANNUAL HEALTH EXAMINATION: Primary | ICD-10-CM

## 2020-11-05 DIAGNOSIS — F43.0 ACUTE STRESS REACTION: ICD-10-CM

## 2020-11-05 DIAGNOSIS — Z12.31 ENCOUNTER FOR SCREENING MAMMOGRAM FOR BREAST CANCER: ICD-10-CM

## 2020-11-05 DIAGNOSIS — I10 BENIGN ESSENTIAL HYPERTENSION: ICD-10-CM

## 2020-11-05 DIAGNOSIS — E55.9 VITAMIN D DEFICIENCY: ICD-10-CM

## 2020-11-05 PROCEDURE — 99213 OFFICE O/P EST LOW 20 MIN: CPT | Performed by: FAMILY MEDICINE

## 2020-11-05 PROCEDURE — G0439 PPPS, SUBSEQ VISIT: HCPCS | Performed by: FAMILY MEDICINE

## 2020-11-05 NOTE — PROGRESS NOTES
HPI:   Guzman Baum is a 70year old female who presents for a Medicare Subsequent Annual Wellness visit (Pt already had Initial Annual Wellness). Compliant with HTN medication. No chest pain or dyspnea with exertion. Moods good.  Motivation go hypertension     Benign neoplasm of colon     Primary osteoarthritis of right knee     Spondylolisthesis at L4-L5 level     Rotator cuff tendonitis, right     Chronic phlebitis of superficial vein of left lower extremity    Wt Readings from Last 3 Encounte (12/4/2017). She  has a past surgical history that includes hand/finger surgery unlisted (1975); colonoscopy (1/11/13); and kash biopsy stereo nodule 1 site right (cpt=19081) (2008). Her family history includes Diabetes in her mother;  Other in her mot lesions   Lymph nodes: Cervical, supraclavicular, and axillary nodes normal   Neurologic: Normal    and Breasts:  normal appearance, no masses or tenderness, Inspection negative, No nipple retraction or dimpling, No nipple discharge or bleeding, No axillar review of chart, separate sheet to patient  [address] Internal Lab or Procedure External Lab or Procedure   Diabetes Screening      HbgA1C   Annually Lab Results   Component Value Date    A1C 5.6 10/27/2020    No flows 8/31/2019 Please get every year    Pneumococcal 13 (Prevnar)  Covered Once after 65 10/23/2015 Please get once after your 65th birthday    Pneumococcal 23 (Pneumovax)  Covered Once after 65 10/22/2013 Please get once after your 65th birthday    Hepatitis B CMP      TSH W Reflex To Free T4      Vitamin D, 25-Hydroxy      Imaging & Consults:  JONATHAN CAMPBELL 2D+3D SCREENING BILAT (CPT=77067/14655)    Reviewed recent labs.   Cholesterol, Total (mg/dL)   Date Value   10/27/2020 213 (H)     CHOLESTEROL, TOTAL (mg/dL)   D

## 2020-11-05 NOTE — PATIENT INSTRUCTIONS
Adelina Thomas's SCREENING SCHEDULE   Tests on this list are recommended by your physician but may not be covered, or covered at this frequency, by your insurer. Please check with your insurance carrier before scheduling to verify coverage.    Cornelius Gunn aortic aneurysm screening (once between ages 73-68) IPPE only No results found for this or any previous visit.  Limited to patients who meet one of the following criteria:   • Men who are 73-68 years old and have smoked more than 100 cigarettes in their lif age 76, and as needed after 76 Mammogram due on 11/19/2020 Please get this Mammogram regularly   Immunizations      Influenza  Covered Annually Orders placed or performed in visit on 10/27/16   • FLU VACC PRSV FREE INC ANTIG    Please get every year    [place]  [email or web address]  This link also has information from the [facility] regarding Advance Directives.

## 2020-12-03 ENCOUNTER — HOSPITAL ENCOUNTER (OUTPATIENT)
Dept: MAMMOGRAPHY | Age: 75
Discharge: HOME | End: 2020-12-03
Attending: FAMILY MEDICINE
Payer: MEDICARE

## 2020-12-03 DIAGNOSIS — Z12.31 ENCOUNTER FOR SCREENING MAMMOGRAM FOR BREAST CANCER: ICD-10-CM

## 2020-12-03 PROCEDURE — 77063 BREAST TOMOSYNTHESIS BI: CPT | Performed by: FAMILY MEDICINE

## 2020-12-03 PROCEDURE — 77067 SCR MAMMO BI INCL CAD: CPT | Performed by: FAMILY MEDICINE

## 2020-12-08 ENCOUNTER — HOSPITAL ENCOUNTER (OUTPATIENT)
Dept: MAMMOGRAPHY | Facility: HOSPITAL | Age: 75
Discharge: HOME | End: 2020-12-08
Attending: FAMILY MEDICINE
Payer: MEDICARE

## 2020-12-08 DIAGNOSIS — R92.2 INCONCLUSIVE MAMMOGRAM: ICD-10-CM

## 2020-12-08 PROCEDURE — 76642 ULTRASOUND BREAST LIMITED: CPT | Performed by: FAMILY MEDICINE

## 2020-12-08 PROCEDURE — 77061 BREAST TOMOSYNTHESIS UNI: CPT | Performed by: FAMILY MEDICINE

## 2020-12-08 PROCEDURE — 77065 DX MAMMO INCL CAD UNI: CPT | Performed by: FAMILY MEDICINE

## 2021-01-04 DIAGNOSIS — I10 BENIGN ESSENTIAL HYPERTENSION: Primary | ICD-10-CM

## 2021-01-04 DIAGNOSIS — F41.1 ANXIETY STATE: ICD-10-CM

## 2021-01-04 DIAGNOSIS — F43.0 ACUTE STRESS REACTION: ICD-10-CM

## 2021-01-05 RX ORDER — HYDROCHLOROTHIAZIDE 25 MG/1
TABLET ORAL
Qty: 90 TABLET | Refills: 1 | Status: SHIPPED | OUTPATIENT
Start: 2021-01-05 | End: 2021-04-12

## 2021-01-05 RX ORDER — ESCITALOPRAM OXALATE 10 MG/1
TABLET ORAL
Qty: 90 TABLET | Refills: 1 | Status: SHIPPED | OUTPATIENT
Start: 2021-01-05 | End: 2021-05-10

## 2021-01-05 NOTE — TELEPHONE ENCOUNTER
Request for refill of pt's HCTZ 25mg and Escitalopram 10mg is also due for refill. Last OV 11/5/2020 last refill for both 6/29/2020 #90 + 1. HCTZ passes protocol but the Escitalopram is not a formulary medication.

## 2021-03-12 DIAGNOSIS — Z23 NEED FOR VACCINATION: ICD-10-CM

## 2021-03-30 RX ORDER — BISOPROLOL FUMARATE 5 MG/1
TABLET ORAL
Qty: 90 TABLET | Refills: 0 | Status: SHIPPED | OUTPATIENT
Start: 2021-03-30 | End: 2021-05-10

## 2021-04-12 ENCOUNTER — TELEPHONE (OUTPATIENT)
Dept: FAMILY MEDICINE CLINIC | Facility: CLINIC | Age: 76
End: 2021-04-12

## 2021-04-12 DIAGNOSIS — I10 BENIGN ESSENTIAL HYPERTENSION: ICD-10-CM

## 2021-04-12 RX ORDER — HYDROCHLOROTHIAZIDE 25 MG/1
25 TABLET ORAL DAILY
Qty: 30 TABLET | Refills: 0 | Status: SHIPPED | OUTPATIENT
Start: 2021-04-12 | End: 2021-04-20

## 2021-04-12 NOTE — TELEPHONE ENCOUNTER
Pt requesting 30 day refill of:    HYDROCHLOROTHIAZIDE 25 MG Oral Tab    To be sent to:  [id number] [address], [phone], [phone]

## 2021-04-20 ENCOUNTER — MED REC SCAN ONLY (OUTPATIENT)
Dept: FAMILY MEDICINE CLINIC | Facility: CLINIC | Age: 76
End: 2021-04-20

## 2021-04-20 ENCOUNTER — LAB ENCOUNTER (OUTPATIENT)
Dept: LAB | Age: 76
End: 2021-04-20
Attending: FAMILY MEDICINE
Payer: MEDICARE

## 2021-04-20 ENCOUNTER — OFFICE VISIT (OUTPATIENT)
Dept: FAMILY MEDICINE CLINIC | Facility: CLINIC | Age: 76
End: 2021-04-20
Payer: MEDICARE

## 2021-04-20 VITALS
RESPIRATION RATE: 12 BRPM | DIASTOLIC BLOOD PRESSURE: 78 MMHG | WEIGHT: 183 LBS | HEIGHT: 64.5 IN | SYSTOLIC BLOOD PRESSURE: 124 MMHG | BODY MASS INDEX: 30.86 KG/M2 | HEART RATE: 64 BPM

## 2021-04-20 DIAGNOSIS — I10 BENIGN ESSENTIAL HYPERTENSION: ICD-10-CM

## 2021-04-20 DIAGNOSIS — F43.0 ACUTE STRESS REACTION: ICD-10-CM

## 2021-04-20 DIAGNOSIS — E78.00 HYPERCHOLESTEREMIA: ICD-10-CM

## 2021-04-20 DIAGNOSIS — F41.1 ANXIETY STATE: ICD-10-CM

## 2021-04-20 DIAGNOSIS — E55.9 VITAMIN D DEFICIENCY: ICD-10-CM

## 2021-04-20 DIAGNOSIS — I10 BENIGN ESSENTIAL HYPERTENSION: Primary | ICD-10-CM

## 2021-04-20 PROCEDURE — 99214 OFFICE O/P EST MOD 30 MIN: CPT | Performed by: FAMILY MEDICINE

## 2021-04-20 PROCEDURE — 82306 VITAMIN D 25 HYDROXY: CPT

## 2021-04-20 PROCEDURE — 80053 COMPREHEN METABOLIC PANEL: CPT

## 2021-04-20 PROCEDURE — 36415 COLL VENOUS BLD VENIPUNCTURE: CPT

## 2021-04-20 PROCEDURE — 84443 ASSAY THYROID STIM HORMONE: CPT

## 2021-04-20 PROCEDURE — 80061 LIPID PANEL: CPT

## 2021-04-20 RX ORDER — HYDROCHLOROTHIAZIDE 25 MG/1
25 TABLET ORAL DAILY
Qty: 90 TABLET | Refills: 0 | COMMUNITY
Start: 2021-04-20 | End: 2021-05-10

## 2021-04-20 NOTE — PROGRESS NOTES
CHIEF COMPLAINT:   Marcial Box a 70year old female is here for followup on HTN  HPI:   Marcial Box has been doing well since the last visit here. Marcial Box  is compliant with hypertensive medication. No chest pain. No dyspnea.  No pal

## 2021-04-21 DIAGNOSIS — E78.5 HYPERLIPIDEMIA, UNSPECIFIED HYPERLIPIDEMIA TYPE: ICD-10-CM

## 2021-04-21 DIAGNOSIS — R77.9 ELEVATED SERUM PROTEIN LEVEL: Primary | ICD-10-CM

## 2021-05-10 DIAGNOSIS — F43.0 ACUTE STRESS REACTION: ICD-10-CM

## 2021-05-10 DIAGNOSIS — I10 BENIGN ESSENTIAL HYPERTENSION: ICD-10-CM

## 2021-05-10 DIAGNOSIS — F41.1 ANXIETY STATE: ICD-10-CM

## 2021-05-10 RX ORDER — HYDROCHLOROTHIAZIDE 25 MG/1
25 TABLET ORAL DAILY
Qty: 90 TABLET | Refills: 0 | Status: SHIPPED | OUTPATIENT
Start: 2021-05-10 | End: 2021-07-22

## 2021-05-10 RX ORDER — ESCITALOPRAM OXALATE 10 MG/1
TABLET ORAL
Qty: 90 TABLET | Refills: 1 | Status: SHIPPED | OUTPATIENT
Start: 2021-05-10 | End: 2021-10-17

## 2021-05-10 RX ORDER — BISOPROLOL FUMARATE 5 MG/1
TABLET ORAL
Qty: 90 TABLET | Refills: 0 | Status: SHIPPED | OUTPATIENT
Start: 2021-05-10 | End: 2021-10-31

## 2021-05-10 NOTE — TELEPHONE ENCOUNTER
Patient needs refill on     hydrochlorothiazide 25 MG Oral Tab 90 tablet     Bisoprolol Fumarate 5 MG Oral Tab 90 tablet     escitalopram 10 MG Oral Tab 90 tablet 1             Send to:     [facility] [id number] [id number] - [address], [address]

## 2021-07-19 DIAGNOSIS — I10 BENIGN ESSENTIAL HYPERTENSION: ICD-10-CM

## 2021-07-22 ENCOUNTER — LAB ENCOUNTER (OUTPATIENT)
Dept: LAB | Age: 76
End: 2021-07-22
Attending: FAMILY MEDICINE
Payer: MEDICARE

## 2021-07-22 DIAGNOSIS — E78.5 HYPERLIPIDEMIA, UNSPECIFIED HYPERLIPIDEMIA TYPE: ICD-10-CM

## 2021-07-22 DIAGNOSIS — R77.9 ELEVATED SERUM PROTEIN LEVEL: ICD-10-CM

## 2021-07-22 LAB
ALBUMIN SERPL-MCNC: 3.9 G/DL (ref 3.4–5)
ALBUMIN/GLOB SERPL: 1.1 {RATIO} (ref 1–2)
ALP LIVER SERPL-CCNC: 56 U/L
ALT SERPL-CCNC: 24 U/L
ANION GAP SERPL CALC-SCNC: 4 MMOL/L (ref 0–18)
AST SERPL-CCNC: 18 U/L (ref 15–37)
BILIRUB SERPL-MCNC: 0.7 MG/DL (ref 0.1–2)
BUN BLD-MCNC: 26 MG/DL (ref 7–18)
BUN/CREAT SERPL: 32.5 (ref 10–20)
CALCIUM BLD-MCNC: 9.6 MG/DL (ref 8.5–10.1)
CHLORIDE SERPL-SCNC: 102 MMOL/L (ref 98–112)
CHOLEST SMN-MCNC: 196 MG/DL (ref ?–200)
CO2 SERPL-SCNC: 30 MMOL/L (ref 21–32)
CREAT BLD-MCNC: 0.8 MG/DL
GLOBULIN PLAS-MCNC: 3.5 G/DL (ref 2.8–4.4)
GLUCOSE BLD-MCNC: 95 MG/DL (ref 70–99)
HDLC SERPL-MCNC: 64 MG/DL (ref 40–59)
LDLC SERPL CALC-MCNC: 118 MG/DL (ref ?–100)
M PROTEIN MFR SERPL ELPH: 7.4 G/DL (ref 6.4–8.2)
NONHDLC SERPL-MCNC: 132 MG/DL (ref ?–130)
OSMOLALITY SERPL CALC.SUM OF ELEC: 287 MOSM/KG (ref 275–295)
PATIENT FASTING Y/N/NP: YES
PATIENT FASTING Y/N/NP: YES
POTASSIUM SERPL-SCNC: 4 MMOL/L (ref 3.5–5.1)
SODIUM SERPL-SCNC: 136 MMOL/L (ref 136–145)
TRIGL SERPL-MCNC: 75 MG/DL (ref 30–149)
VLDLC SERPL CALC-MCNC: 13 MG/DL (ref 0–30)

## 2021-07-22 PROCEDURE — 36415 COLL VENOUS BLD VENIPUNCTURE: CPT

## 2021-07-22 PROCEDURE — 80053 COMPREHEN METABOLIC PANEL: CPT

## 2021-07-22 PROCEDURE — 80061 LIPID PANEL: CPT

## 2021-07-22 RX ORDER — HYDROCHLOROTHIAZIDE 25 MG/1
TABLET ORAL
Qty: 90 TABLET | Refills: 0 | Status: SHIPPED | OUTPATIENT
Start: 2021-07-22 | End: 2021-10-17

## 2021-10-17 DIAGNOSIS — F43.0 ACUTE STRESS REACTION: ICD-10-CM

## 2021-10-17 DIAGNOSIS — I10 BENIGN ESSENTIAL HYPERTENSION: ICD-10-CM

## 2021-10-17 DIAGNOSIS — F41.1 ANXIETY STATE: ICD-10-CM

## 2021-10-17 RX ORDER — ESCITALOPRAM OXALATE 10 MG/1
TABLET ORAL
Qty: 90 TABLET | Refills: 0 | Status: SHIPPED | OUTPATIENT
Start: 2021-10-17 | End: 2022-01-06

## 2021-10-17 RX ORDER — HYDROCHLOROTHIAZIDE 25 MG/1
TABLET ORAL
Qty: 90 TABLET | Refills: 0 | Status: SHIPPED | OUTPATIENT
Start: 2021-10-17 | End: 2022-01-06

## 2021-10-30 ENCOUNTER — PATIENT MESSAGE (OUTPATIENT)
Dept: FAMILY MEDICINE CLINIC | Facility: CLINIC | Age: 76
End: 2021-10-30

## 2021-10-31 RX ORDER — BISOPROLOL FUMARATE 5 MG/1
TABLET ORAL
Qty: 90 TABLET | Refills: 0 | Status: SHIPPED | OUTPATIENT
Start: 2021-10-31 | End: 2021-12-16

## 2021-10-31 NOTE — TELEPHONE ENCOUNTER
From: Az Olivares  To: Misael Carvalho PA-C  Sent: 10/30/2021 12:51 PM CDT  Subject: Bisoprolol prescription was not refilled    Torey Maynard:  I'm a little concerned because I am completely out of my Bisoprolol Fumarate 5 mg.  as of Friday, Octob

## 2021-11-04 ENCOUNTER — OFFICE VISIT (OUTPATIENT)
Dept: FAMILY MEDICINE CLINIC | Facility: CLINIC | Age: 76
End: 2021-11-04
Payer: MEDICARE

## 2021-11-04 ENCOUNTER — TELEPHONE (OUTPATIENT)
Dept: FAMILY MEDICINE CLINIC | Facility: CLINIC | Age: 76
End: 2021-11-04

## 2021-11-04 VITALS
DIASTOLIC BLOOD PRESSURE: 82 MMHG | HEIGHT: 64.5 IN | TEMPERATURE: 98 F | HEART RATE: 68 BPM | WEIGHT: 192 LBS | RESPIRATION RATE: 12 BRPM | SYSTOLIC BLOOD PRESSURE: 128 MMHG | BODY MASS INDEX: 32.38 KG/M2

## 2021-11-04 DIAGNOSIS — E55.9 VITAMIN D DEFICIENCY: ICD-10-CM

## 2021-11-04 DIAGNOSIS — I10 BENIGN ESSENTIAL HYPERTENSION: ICD-10-CM

## 2021-11-04 DIAGNOSIS — Z12.31 ENCOUNTER FOR SCREENING MAMMOGRAM FOR MALIGNANT NEOPLASM OF BREAST: ICD-10-CM

## 2021-11-04 DIAGNOSIS — Z13.0 SCREENING FOR DEFICIENCY ANEMIA: ICD-10-CM

## 2021-11-04 DIAGNOSIS — Z78.0 MENOPAUSE: ICD-10-CM

## 2021-11-04 DIAGNOSIS — Z00.00 ENCOUNTER FOR ANNUAL HEALTH EXAMINATION: Primary | ICD-10-CM

## 2021-11-04 DIAGNOSIS — E78.00 HYPERCHOLESTEREMIA: ICD-10-CM

## 2021-11-04 PROCEDURE — 99213 OFFICE O/P EST LOW 20 MIN: CPT | Performed by: FAMILY MEDICINE

## 2021-11-04 PROCEDURE — G0439 PPPS, SUBSEQ VISIT: HCPCS | Performed by: FAMILY MEDICINE

## 2021-11-04 RX ORDER — FUROSEMIDE 20 MG/1
20 TABLET ORAL DAILY PRN
Qty: 30 TABLET | Refills: 1 | Status: SHIPPED | OUTPATIENT
Start: 2021-11-04 | End: 2022-01-02

## 2021-11-04 RX ORDER — FUROSEMIDE 20 MG/1
TABLET ORAL
Qty: 90 TABLET | Refills: 0 | OUTPATIENT
Start: 2021-11-04

## 2021-11-04 NOTE — PROGRESS NOTES
HPI:   Jose Juan Vargas is a 78year old female who presents for a Medicare Subsequent Annual Wellness visit (Pt already had Initial Annual Wellness).     Patient declines Pap and pelvic exam.  Last mammogram December 2020  Last DEXA 2016  Colonoscopy 20 level     Rotator cuff tendonitis, right     Chronic phlebitis of superficial vein of left lower extremity    Wt Readings from Last 3 Encounters:  11/04/21 : 192 lb (87.1 kg)  04/20/21 : 183 lb (83 kg)  11/05/20 : 182 lb (82.6 kg)     Last Cholesterol Labs mother; Other (age of onset: 48) in her father; Ovarian Cancer in her maternal aunt. SOCIAL HISTORY:   She  reports that she has never smoked. She has never used smokeless tobacco. She reports current alcohol use. She reports that she does not use drugs. tenderness, Inspection negative, No nipple retraction or dimpling, No nipple discharge or bleeding, No axillary or supraclavicular adenopathy    Vaccination History     Immunization History   Administered Date(s) Administered   • Celestone Soluspan 3mg  12 Supplementary Documentation:   Adelina Thomas's SCREENING SCHEDULE   Tests on this list are recommended by your physician but may not be covered, or covered at this frequency, by your insurer.    Please check with your insurance carrier before schedu DEXA BONE DENSITOMETRY (CPT=77080) 11/18/2016      No recommendations at this time   Pap and Pelvic    Pap   Covered every 2 years for women at normal risk;  Annually if at high risk -  No recommendations at this time    Chlamydia Annually if high risk -  N DENSITOMETRY (CPT=77080)  JONATHAN ADRIAN 2D+3D SCREENING BILAT (CPT=77067/46391)    Follow up 6 months and prn.

## 2021-11-04 NOTE — PATIENT INSTRUCTIONS
Adelina Thomas's SCREENING SCHEDULE   Tests on this list are recommended by your physician but may not be covered, or covered at this frequency, by your insurer. Please check with your insurance carrier before scheduling to verify coverage.    PREVEN 11/18/2016      No recommendations at this time   Pap and Pelvic    Pap   Covered every 2 years for women at normal risk;  Annually if at high risk -  No recommendations at this time    Chlamydia Annually if high risk -  No recommendations at this time   Sc link to the [employer]. This site has a lot of good information including definitions of the different types of Advance Directives.  It also has the State forms available on it's website for anyone to review and print using their home co

## 2021-11-04 NOTE — TELEPHONE ENCOUNTER
Chart review indicates rx for Furosemide 20mg was sent to pharmacy today by Italia Vaughan. Pt notified with this information.

## 2021-12-03 ENCOUNTER — TELEPHONE (OUTPATIENT)
Dept: FAMILY MEDICINE CLINIC | Facility: CLINIC | Age: 76
End: 2021-12-03

## 2021-12-03 ENCOUNTER — LAB ENCOUNTER (OUTPATIENT)
Dept: LAB | Age: 76
End: 2021-12-03
Attending: FAMILY MEDICINE
Payer: MEDICARE

## 2021-12-03 ENCOUNTER — TELEMEDICINE (OUTPATIENT)
Dept: FAMILY MEDICINE CLINIC | Facility: CLINIC | Age: 76
End: 2021-12-03
Payer: MEDICARE

## 2021-12-03 DIAGNOSIS — R09.81 NASAL CONGESTION: Primary | ICD-10-CM

## 2021-12-03 DIAGNOSIS — R05.9 COUGH: ICD-10-CM

## 2021-12-03 DIAGNOSIS — R09.81 NASAL CONGESTION: ICD-10-CM

## 2021-12-03 PROCEDURE — 99442 PHONE E/M BY PHYS 11-20 MIN: CPT | Performed by: FAMILY MEDICINE

## 2021-12-03 NOTE — TELEPHONE ENCOUNTER
Adelina altamirano, scheduled for a Virtual Visit w/ Amanda Frausto today, at 11:30 AM.  Informed Tereza Stafford on how to use video visit system.

## 2021-12-03 NOTE — TELEPHONE ENCOUNTER
Pt has had nasal congestion and cough w/plegm since Sunday evening. Asking for recommendations on medication or over the counter medication. Pls advise.

## 2021-12-03 NOTE — TELEPHONE ENCOUNTER
Congestion, Cough   Onset: 11/28/21    Reports nasal and head congestion, cough with thin clear/yellow phlegm  Sneezing  Symptoms are worse at night   Sts symptoms have been improving since 11/28/21    Denies fever, body aches, nausea, vomiting, diarrhea,

## 2021-12-03 NOTE — PROGRESS NOTES
Marcial Box is a 78year old female. CHIEF COMPLAINT   Nasal congestion and cough  HPI:   This visit is conducted using Telemedicine with audio only - unable to connect to video.  Patient understands and accepts financial responsibility for any deduc otherwise    EXAM:   [facility] 09/25/1998   RESP:  Speaking in full sentences. No difficulty speaking. 11:42-11:57 - telephone call with patient.  15 minutes  12:00-12:02 - charting - 2 minutes  ASSESSMENT AND PLAN:     Nasal congestion  (primary encounter di

## 2021-12-08 ENCOUNTER — HOSPITAL ENCOUNTER (OUTPATIENT)
Dept: BONE DENSITY | Age: 76
Discharge: HOME | End: 2021-12-08
Attending: FAMILY MEDICINE
Payer: MEDICARE

## 2021-12-08 ENCOUNTER — HOSPITAL ENCOUNTER (OUTPATIENT)
Dept: MAMMOGRAPHY | Age: 76
Discharge: HOME | End: 2021-12-08
Attending: FAMILY MEDICINE
Payer: MEDICARE

## 2021-12-08 DIAGNOSIS — Z78.0 MENOPAUSE: ICD-10-CM

## 2021-12-08 DIAGNOSIS — Z12.31 ENCOUNTER FOR SCREENING MAMMOGRAM FOR MALIGNANT NEOPLASM OF BREAST: ICD-10-CM

## 2021-12-08 PROCEDURE — 77080 DXA BONE DENSITY AXIAL: CPT | Performed by: FAMILY MEDICINE

## 2021-12-08 PROCEDURE — 77067 SCR MAMMO BI INCL CAD: CPT | Performed by: FAMILY MEDICINE

## 2021-12-08 PROCEDURE — 77063 BREAST TOMOSYNTHESIS BI: CPT | Performed by: FAMILY MEDICINE

## 2021-12-16 RX ORDER — BISOPROLOL FUMARATE 5 MG/1
TABLET ORAL
Qty: 90 TABLET | Refills: 0 | Status: SHIPPED | OUTPATIENT
Start: 2021-12-16 | End: 2022-01-06

## 2022-01-02 RX ORDER — FUROSEMIDE 20 MG/1
TABLET ORAL
Qty: 30 TABLET | Refills: 1 | Status: SHIPPED | OUTPATIENT
Start: 2022-01-02

## 2022-01-06 DIAGNOSIS — F41.1 ANXIETY STATE: ICD-10-CM

## 2022-01-06 DIAGNOSIS — I10 BENIGN ESSENTIAL HYPERTENSION: ICD-10-CM

## 2022-01-06 DIAGNOSIS — F43.0 ACUTE STRESS REACTION: ICD-10-CM

## 2022-01-06 RX ORDER — HYDROCHLOROTHIAZIDE 25 MG/1
25 TABLET ORAL DAILY
Qty: 90 TABLET | Refills: 0 | Status: SHIPPED | OUTPATIENT
Start: 2022-01-06

## 2022-01-06 RX ORDER — ESCITALOPRAM OXALATE 10 MG/1
10 TABLET ORAL DAILY
Qty: 90 TABLET | Refills: 0 | Status: SHIPPED | OUTPATIENT
Start: 2022-01-06

## 2022-01-06 RX ORDER — BISOPROLOL FUMARATE 5 MG/1
TABLET ORAL
Qty: 90 TABLET | Refills: 0 | Status: SHIPPED | OUTPATIENT
Start: 2022-01-06

## 2022-01-06 NOTE — TELEPHONE ENCOUNTER
Pt would like refill of   bisoprolol 5 MG Oral Tab  HYDROCHLOROTHIAZIDE 25 MG Oral Tab  ESCITALOPRAM 10 MG Oral Tab    Sent to [facility], [place] - [address] [place] [address] [phone], [phone]  Thank you. Josephine Gusman

## 2022-01-15 DIAGNOSIS — I10 BENIGN ESSENTIAL HYPERTENSION: ICD-10-CM

## 2022-01-15 DIAGNOSIS — F41.1 ANXIETY STATE: ICD-10-CM

## 2022-01-15 DIAGNOSIS — F43.0 ACUTE STRESS REACTION: ICD-10-CM

## 2022-01-15 NOTE — TELEPHONE ENCOUNTER
[place] [facility] LM for her to call.   LOV 11/4/21  NOV 5/5/22

## 2022-01-17 RX ORDER — HYDROCHLOROTHIAZIDE 25 MG/1
TABLET ORAL
Qty: 90 TABLET | Refills: 0 | OUTPATIENT
Start: 2022-01-17

## 2022-01-17 RX ORDER — ESCITALOPRAM OXALATE 10 MG/1
TABLET ORAL
Qty: 90 TABLET | Refills: 0 | OUTPATIENT
Start: 2022-01-17

## 2022-03-16 RX ORDER — BISOPROLOL FUMARATE 5 MG/1
TABLET ORAL
Qty: 90 TABLET | Refills: 0 | Status: SHIPPED | OUTPATIENT
Start: 2022-03-16

## 2022-03-16 RX ORDER — HYDROCHLOROTHIAZIDE 25 MG/1
25 TABLET ORAL DAILY
Qty: 90 TABLET | Refills: 0 | Status: SHIPPED | OUTPATIENT
Start: 2022-03-16

## 2022-03-16 RX ORDER — ESCITALOPRAM OXALATE 10 MG/1
TABLET ORAL
Qty: 90 TABLET | Refills: 0 | Status: SHIPPED | OUTPATIENT
Start: 2022-03-16

## 2022-03-21 ENCOUNTER — LAB ENCOUNTER (OUTPATIENT)
Dept: LAB | Age: 77
End: 2022-03-21
Attending: FAMILY MEDICINE
Payer: MEDICARE

## 2022-03-21 DIAGNOSIS — I10 BENIGN ESSENTIAL HYPERTENSION: ICD-10-CM

## 2022-03-21 DIAGNOSIS — E55.9 VITAMIN D DEFICIENCY: ICD-10-CM

## 2022-03-21 DIAGNOSIS — E78.00 HYPERCHOLESTEREMIA: ICD-10-CM

## 2022-03-21 DIAGNOSIS — Z13.0 SCREENING FOR DEFICIENCY ANEMIA: ICD-10-CM

## 2022-03-21 LAB
ALBUMIN SERPL-MCNC: 3.9 G/DL (ref 3.4–5)
ALBUMIN/GLOB SERPL: 1.2 {RATIO} (ref 1–2)
ALP LIVER SERPL-CCNC: 56 U/L
ALT SERPL-CCNC: 24 U/L
ANION GAP SERPL CALC-SCNC: 5 MMOL/L (ref 0–18)
AST SERPL-CCNC: 21 U/L (ref 15–37)
BASOPHILS # BLD AUTO: 0.02 X10(3) UL (ref 0–0.2)
BASOPHILS NFR BLD AUTO: 0.4 %
BILIRUB SERPL-MCNC: 0.7 MG/DL (ref 0.1–2)
BUN BLD-MCNC: 22 MG/DL (ref 7–18)
CALCIUM BLD-MCNC: 9.7 MG/DL (ref 8.5–10.1)
CHLORIDE SERPL-SCNC: 100 MMOL/L (ref 98–112)
CHOLEST SERPL-MCNC: 206 MG/DL (ref ?–200)
CO2 SERPL-SCNC: 32 MMOL/L (ref 21–32)
CREAT BLD-MCNC: 0.88 MG/DL
EOSINOPHIL # BLD AUTO: 0.13 X10(3) UL (ref 0–0.7)
EOSINOPHIL NFR BLD AUTO: 2.4 %
ERYTHROCYTE [DISTWIDTH] IN BLOOD BY AUTOMATED COUNT: 12.9 %
FASTING PATIENT LIPID ANSWER: YES
FASTING STATUS PATIENT QL REPORTED: YES
GLOBULIN PLAS-MCNC: 3.2 G/DL (ref 2.8–4.4)
GLUCOSE BLD-MCNC: 105 MG/DL (ref 70–99)
HCT VFR BLD AUTO: 41.2 %
HDLC SERPL-MCNC: 69 MG/DL (ref 40–59)
HGB BLD-MCNC: 13.3 G/DL
IMM GRANULOCYTES # BLD AUTO: 0.01 X10(3) UL (ref 0–1)
IMM GRANULOCYTES NFR BLD: 0.2 %
LDLC SERPL CALC-MCNC: 119 MG/DL (ref ?–100)
LYMPHOCYTES # BLD AUTO: 1.4 X10(3) UL (ref 1–4)
LYMPHOCYTES NFR BLD AUTO: 26.4 %
MCH RBC QN AUTO: 30.1 PG (ref 26–34)
MCHC RBC AUTO-ENTMCNC: 32.3 G/DL (ref 31–37)
MCV RBC AUTO: 93.2 FL
MONOCYTES # BLD AUTO: 0.4 X10(3) UL (ref 0.1–1)
MONOCYTES NFR BLD AUTO: 7.5 %
NEUTROPHILS # BLD AUTO: 3.35 X10 (3) UL (ref 1.5–7.7)
NEUTROPHILS # BLD AUTO: 3.35 X10(3) UL (ref 1.5–7.7)
NEUTROPHILS NFR BLD AUTO: 63.1 %
NONHDLC SERPL-MCNC: 137 MG/DL (ref ?–130)
OSMOLALITY SERPL CALC.SUM OF ELEC: 288 MOSM/KG (ref 275–295)
PLATELET # BLD AUTO: 269 10(3)UL (ref 150–450)
POTASSIUM SERPL-SCNC: 4.2 MMOL/L (ref 3.5–5.1)
PROT SERPL-MCNC: 7.1 G/DL (ref 6.4–8.2)
RBC # BLD AUTO: 4.42 X10(6)UL
SODIUM SERPL-SCNC: 137 MMOL/L (ref 136–145)
TRIGL SERPL-MCNC: 101 MG/DL (ref 30–149)
VIT D+METAB SERPL-MCNC: 62.2 NG/ML (ref 30–100)
VLDLC SERPL CALC-MCNC: 18 MG/DL (ref 0–30)
WBC # BLD AUTO: 5.3 X10(3) UL (ref 4–11)

## 2022-03-21 PROCEDURE — 80061 LIPID PANEL: CPT

## 2022-03-21 PROCEDURE — 82306 VITAMIN D 25 HYDROXY: CPT

## 2022-03-21 PROCEDURE — 85025 COMPLETE CBC W/AUTO DIFF WBC: CPT

## 2022-03-21 PROCEDURE — 80053 COMPREHEN METABOLIC PANEL: CPT

## 2022-05-05 ENCOUNTER — OFFICE VISIT (OUTPATIENT)
Dept: FAMILY MEDICINE CLINIC | Facility: CLINIC | Age: 77
End: 2022-05-05
Payer: MEDICARE

## 2022-05-05 VITALS
TEMPERATURE: 98 F | HEIGHT: 64.5 IN | DIASTOLIC BLOOD PRESSURE: 76 MMHG | BODY MASS INDEX: 32.85 KG/M2 | RESPIRATION RATE: 16 BRPM | SYSTOLIC BLOOD PRESSURE: 124 MMHG | HEART RATE: 72 BPM | WEIGHT: 194.81 LBS

## 2022-05-05 DIAGNOSIS — F43.0 ACUTE STRESS REACTION: ICD-10-CM

## 2022-05-05 DIAGNOSIS — I10 BENIGN ESSENTIAL HYPERTENSION: Primary | ICD-10-CM

## 2022-05-05 DIAGNOSIS — F41.1 ANXIETY STATE: ICD-10-CM

## 2022-05-05 DIAGNOSIS — R73.09 ELEVATED GLUCOSE: ICD-10-CM

## 2022-05-05 PROCEDURE — 99214 OFFICE O/P EST MOD 30 MIN: CPT | Performed by: FAMILY MEDICINE

## 2022-05-05 RX ORDER — BISOPROLOL FUMARATE 5 MG/1
TABLET ORAL
Qty: 90 TABLET | Refills: 1 | Status: SHIPPED | OUTPATIENT
Start: 2022-05-05

## 2022-05-05 RX ORDER — HYDROCHLOROTHIAZIDE 25 MG/1
25 TABLET ORAL DAILY
Qty: 90 TABLET | Refills: 1 | Status: SHIPPED | OUTPATIENT
Start: 2022-05-05

## 2022-05-05 RX ORDER — ESCITALOPRAM OXALATE 10 MG/1
10 TABLET ORAL DAILY
Qty: 90 TABLET | Refills: 1 | Status: SHIPPED | OUTPATIENT
Start: 2022-05-05

## 2022-05-05 RX ORDER — VITAMIN E 268 MG
1000 CAPSULE ORAL DAILY
COMMUNITY

## 2022-05-16 ENCOUNTER — TELEPHONE (OUTPATIENT)
Dept: ORTHOPEDICS CLINIC | Facility: CLINIC | Age: 77
End: 2022-05-16

## 2022-05-16 NOTE — TELEPHONE ENCOUNTER
NP Bilateral Knees/Problems with knees when she gets up from a sitting position/Discuss cortizone injections. Please advise if imaging is needed.

## 2022-07-28 ENCOUNTER — PATIENT MESSAGE (OUTPATIENT)
Dept: FAMILY MEDICINE CLINIC | Facility: CLINIC | Age: 77
End: 2022-07-28

## 2022-07-28 DIAGNOSIS — R93.89 ABNORMAL X-RAY OF NECK: Primary | ICD-10-CM

## 2022-07-28 DIAGNOSIS — I65.29 CAROTID ARTERY CALCIFICATION, UNSPECIFIED LATERALITY: ICD-10-CM

## 2022-07-31 NOTE — TELEPHONE ENCOUNTER
From: Leon Round  To: Linda Lemos PA-C  Sent: 7/28/2022 9:10 PM CDT  Subject: Carotid Artery Calcifications on Left Side    Torey Maynard:  I went to my dentist yesterday for my 6 month checkup, and he did a routine panoramic x-ray. The x-ray showed some calcifications on the left carotid artery, so he told me to contact you and then possibly have you refer me to a cardiologist. Can you please get back to me and let me know what I should do next? I'm a little nervous about this. Thanks, Alan Meyer.

## 2022-08-01 ENCOUNTER — LAB ENCOUNTER (OUTPATIENT)
Dept: LAB | Age: 77
End: 2022-08-01
Attending: FAMILY MEDICINE
Payer: MEDICARE

## 2022-08-01 DIAGNOSIS — E55.9 VITAMIN D DEFICIENCY: ICD-10-CM

## 2022-08-01 DIAGNOSIS — R73.09 ELEVATED GLUCOSE: ICD-10-CM

## 2022-08-01 LAB
EST. AVERAGE GLUCOSE BLD GHB EST-MCNC: 117 MG/DL (ref 68–126)
HBA1C MFR BLD: 5.7 % (ref ?–5.7)
VIT D+METAB SERPL-MCNC: 48.2 NG/ML (ref 30–100)

## 2022-08-01 PROCEDURE — 83036 HEMOGLOBIN GLYCOSYLATED A1C: CPT

## 2022-08-01 PROCEDURE — 82306 VITAMIN D 25 HYDROXY: CPT

## 2022-08-02 ENCOUNTER — HOSPITAL ENCOUNTER (OUTPATIENT)
Dept: ULTRASOUND IMAGING | Age: 77
Discharge: HOME | End: 2022-08-02
Attending: FAMILY MEDICINE
Payer: MEDICARE

## 2022-08-02 DIAGNOSIS — I65.29 CAROTID ARTERY CALCIFICATION, UNSPECIFIED LATERALITY: ICD-10-CM

## 2022-08-02 DIAGNOSIS — R93.89 ABNORMAL X-RAY OF NECK: ICD-10-CM

## 2022-08-02 PROBLEM — I65.23 BILATERAL CAROTID ARTERY STENOSIS: Status: ACTIVE | Noted: 2022-08-02

## 2022-08-02 PROCEDURE — 93880 EXTRACRANIAL BILAT STUDY: CPT | Performed by: FAMILY MEDICINE

## 2022-09-09 ENCOUNTER — TELEPHONE (OUTPATIENT)
Dept: FAMILY MEDICINE CLINIC | Facility: CLINIC | Age: 77
End: 2022-09-09

## 2022-09-15 ENCOUNTER — OFFICE VISIT (OUTPATIENT)
Dept: FAMILY MEDICINE CLINIC | Facility: CLINIC | Age: 77
End: 2022-09-15
Payer: MEDICARE

## 2022-09-15 VITALS — BODY MASS INDEX: 32.89 KG/M2 | WEIGHT: 195 LBS | HEIGHT: 64.5 IN

## 2022-09-15 DIAGNOSIS — Z51.81 ENCOUNTER FOR MONITORING STATIN THERAPY: ICD-10-CM

## 2022-09-15 DIAGNOSIS — E78.5 HYPERLIPIDEMIA LDL GOAL <70: ICD-10-CM

## 2022-09-15 DIAGNOSIS — I65.23 BILATERAL CAROTID ARTERY STENOSIS: Primary | ICD-10-CM

## 2022-09-15 DIAGNOSIS — Z79.899 ENCOUNTER FOR MONITORING STATIN THERAPY: ICD-10-CM

## 2022-09-15 PROCEDURE — 99214 OFFICE O/P EST MOD 30 MIN: CPT | Performed by: FAMILY MEDICINE

## 2022-09-15 RX ORDER — ROSUVASTATIN CALCIUM 10 MG/1
10 TABLET, COATED ORAL NIGHTLY
Qty: 90 TABLET | Refills: 0 | Status: SHIPPED | OUTPATIENT
Start: 2022-09-15

## 2022-09-15 NOTE — PATIENT INSTRUCTIONS
Start rosuvastatin daily in the evening. Take 81mg aspirin daily. Check fasting blood work in 8 weeks after starting the medication. Plan to repeat a carotid doppler in 2 years. LDL cholesterol goal is less than 70.

## 2022-10-10 DIAGNOSIS — F41.1 ANXIETY STATE: ICD-10-CM

## 2022-10-10 DIAGNOSIS — F43.0 ACUTE STRESS REACTION: ICD-10-CM

## 2022-10-10 DIAGNOSIS — I10 BENIGN ESSENTIAL HYPERTENSION: ICD-10-CM

## 2022-10-10 RX ORDER — ESCITALOPRAM OXALATE 10 MG/1
TABLET ORAL
Qty: 90 TABLET | Refills: 1 | Status: SHIPPED | OUTPATIENT
Start: 2022-10-10

## 2022-10-10 RX ORDER — BISOPROLOL FUMARATE 5 MG/1
TABLET ORAL
Qty: 90 TABLET | Refills: 1 | Status: SHIPPED | OUTPATIENT
Start: 2022-10-10

## 2022-10-10 RX ORDER — HYDROCHLOROTHIAZIDE 25 MG/1
TABLET ORAL
Qty: 90 TABLET | Refills: 1 | Status: SHIPPED | OUTPATIENT
Start: 2022-10-10

## 2022-10-10 NOTE — TELEPHONE ENCOUNTER
ESCITALOPRAM OXALATE 10 MG Tablet    Please see pended medications. Please sign if appropriate.       Thank you      Last OV: 09/15/2022      Last refill: 05/05/2022 for 90/1 refill      Upcoming appt is schedule for 11/23/2022

## 2022-11-16 ENCOUNTER — TELEPHONE (OUTPATIENT)
Dept: FAMILY MEDICINE CLINIC | Facility: CLINIC | Age: 77
End: 2022-11-16

## 2022-11-16 DIAGNOSIS — Z79.899 ENCOUNTER FOR MONITORING STATIN THERAPY: ICD-10-CM

## 2022-11-16 DIAGNOSIS — I65.23 BILATERAL CAROTID ARTERY STENOSIS: ICD-10-CM

## 2022-11-16 DIAGNOSIS — Z51.81 ENCOUNTER FOR MONITORING STATIN THERAPY: ICD-10-CM

## 2022-11-16 RX ORDER — ROSUVASTATIN CALCIUM 10 MG/1
TABLET, COATED ORAL
Qty: 90 TABLET | Refills: 0 | Status: SHIPPED | OUTPATIENT
Start: 2022-11-16

## 2022-11-16 NOTE — TELEPHONE ENCOUNTER
Medical Records Request received from [employer] for records from last 2 OV notes. Release original faxed to [employer] on 11/16/2022. Copy sent to scanning. Received call from [employer] prior to fax request received. Caller indicates initial request for records was sent on 10/26, no request received. Called requesting STAT processing.

## 2022-11-18 ENCOUNTER — LAB ENCOUNTER (OUTPATIENT)
Dept: LAB | Age: 77
End: 2022-11-18
Attending: FAMILY MEDICINE
Payer: MEDICARE

## 2022-11-18 DIAGNOSIS — Z79.899 ENCOUNTER FOR MONITORING STATIN THERAPY: ICD-10-CM

## 2022-11-18 DIAGNOSIS — Z51.81 ENCOUNTER FOR MONITORING STATIN THERAPY: ICD-10-CM

## 2022-11-18 DIAGNOSIS — I65.23 BILATERAL CAROTID ARTERY STENOSIS: ICD-10-CM

## 2022-11-18 LAB
ALBUMIN SERPL-MCNC: 3.8 G/DL (ref 3.4–5)
ALBUMIN/GLOB SERPL: 1.3 {RATIO} (ref 1–2)
ALP LIVER SERPL-CCNC: 58 U/L
ALT SERPL-CCNC: 25 U/L
ANION GAP SERPL CALC-SCNC: 6 MMOL/L (ref 0–18)
AST SERPL-CCNC: 18 U/L (ref 15–37)
BILIRUB SERPL-MCNC: 0.8 MG/DL (ref 0.1–2)
BUN BLD-MCNC: 15 MG/DL (ref 7–18)
BUN/CREAT SERPL: 18.3 (ref 10–20)
CALCIUM BLD-MCNC: 9.4 MG/DL (ref 8.5–10.1)
CHLORIDE SERPL-SCNC: 101 MMOL/L (ref 98–112)
CHOLEST SERPL-MCNC: 140 MG/DL (ref ?–200)
CO2 SERPL-SCNC: 32 MMOL/L (ref 21–32)
CREAT BLD-MCNC: 0.82 MG/DL
FASTING PATIENT LIPID ANSWER: YES
FASTING STATUS PATIENT QL REPORTED: YES
GFR SERPLBLD BASED ON 1.73 SQ M-ARVRAT: 75 ML/MIN/1.73M2 (ref 60–?)
GLOBULIN PLAS-MCNC: 2.9 G/DL (ref 2.8–4.4)
GLUCOSE BLD-MCNC: 92 MG/DL (ref 70–99)
HDLC SERPL-MCNC: 71 MG/DL (ref 40–59)
LDLC SERPL CALC-MCNC: 54 MG/DL (ref ?–100)
NONHDLC SERPL-MCNC: 69 MG/DL (ref ?–130)
OSMOLALITY SERPL CALC.SUM OF ELEC: 288 MOSM/KG (ref 275–295)
POTASSIUM SERPL-SCNC: 4.6 MMOL/L (ref 3.5–5.1)
PROT SERPL-MCNC: 6.7 G/DL (ref 6.4–8.2)
SODIUM SERPL-SCNC: 139 MMOL/L (ref 136–145)
TRIGL SERPL-MCNC: 80 MG/DL (ref 30–149)
VLDLC SERPL CALC-MCNC: 12 MG/DL (ref 0–30)

## 2022-11-18 PROCEDURE — 80061 LIPID PANEL: CPT

## 2022-11-18 PROCEDURE — 80053 COMPREHEN METABOLIC PANEL: CPT

## 2022-11-23 ENCOUNTER — OFFICE VISIT (OUTPATIENT)
Dept: FAMILY MEDICINE CLINIC | Facility: CLINIC | Age: 77
End: 2022-11-23
Payer: MEDICARE

## 2022-11-23 VITALS
HEART RATE: 64 BPM | DIASTOLIC BLOOD PRESSURE: 68 MMHG | TEMPERATURE: 98 F | SYSTOLIC BLOOD PRESSURE: 124 MMHG | WEIGHT: 197.81 LBS | HEIGHT: 64.5 IN | BODY MASS INDEX: 33.36 KG/M2 | RESPIRATION RATE: 12 BRPM

## 2022-11-23 DIAGNOSIS — Z00.00 ENCOUNTER FOR ANNUAL HEALTH EXAMINATION: Primary | ICD-10-CM

## 2022-11-23 DIAGNOSIS — Z12.83 SKIN CANCER SCREENING: ICD-10-CM

## 2022-11-23 DIAGNOSIS — Z12.31 BREAST CANCER SCREENING BY MAMMOGRAM: ICD-10-CM

## 2022-11-23 DIAGNOSIS — Z79.899 ENCOUNTER FOR MONITORING STATIN THERAPY: ICD-10-CM

## 2022-11-23 DIAGNOSIS — I10 BENIGN ESSENTIAL HYPERTENSION: ICD-10-CM

## 2022-11-23 DIAGNOSIS — E78.5 HYPERLIPIDEMIA LDL GOAL <70: ICD-10-CM

## 2022-11-23 DIAGNOSIS — Z51.81 ENCOUNTER FOR MONITORING STATIN THERAPY: ICD-10-CM

## 2022-11-23 DIAGNOSIS — F41.1 ANXIETY STATE: ICD-10-CM

## 2022-11-23 DIAGNOSIS — E55.9 VITAMIN D DEFICIENCY: ICD-10-CM

## 2022-11-23 DIAGNOSIS — R73.09 ELEVATED GLUCOSE: ICD-10-CM

## 2022-11-23 PROCEDURE — 99214 OFFICE O/P EST MOD 30 MIN: CPT | Performed by: FAMILY MEDICINE

## 2022-11-23 PROCEDURE — G0439 PPPS, SUBSEQ VISIT: HCPCS | Performed by: FAMILY MEDICINE

## 2022-12-05 LAB — AMB EXT COLOGUARD RESULT: NEGATIVE

## 2022-12-14 ENCOUNTER — HOSPITAL ENCOUNTER (OUTPATIENT)
Dept: MAMMOGRAPHY | Age: 77
Discharge: HOME | End: 2022-12-14
Attending: FAMILY MEDICINE
Payer: MEDICARE

## 2022-12-14 DIAGNOSIS — Z12.31 BREAST CANCER SCREENING BY MAMMOGRAM: ICD-10-CM

## 2022-12-14 PROCEDURE — 77063 BREAST TOMOSYNTHESIS BI: CPT | Performed by: FAMILY MEDICINE

## 2022-12-14 PROCEDURE — 77067 SCR MAMMO BI INCL CAD: CPT | Performed by: FAMILY MEDICINE

## 2022-12-15 ENCOUNTER — MED REC SCAN ONLY (OUTPATIENT)
Dept: FAMILY MEDICINE CLINIC | Facility: CLINIC | Age: 77
End: 2022-12-15

## 2023-02-15 ENCOUNTER — PATIENT MESSAGE (OUTPATIENT)
Dept: FAMILY MEDICINE CLINIC | Facility: CLINIC | Age: 78
End: 2023-02-15

## 2023-02-16 NOTE — TELEPHONE ENCOUNTER
Appears that she has had 5 Covid vaccines. Currently no additional boosters would be recommended for her. News should come over the next 9 months with CDC recommendations for any updated boosters.

## 2023-02-16 NOTE — TELEPHONE ENCOUNTER
From: Nakul Strong  To: Juan J Kay PA-C  Sent: 2/15/2023 6:58 PM CST  Subject: covid booster shots    Torey Braden Go  I had a mild case of covid last December with no repercussions. My question now is whether or not I have to continue getting these shots every four to six months. I'm not sure if it's necessary to get any more boosters at this time, but wanted to get your opinion first. Thanks, Sampson Go.

## 2023-04-26 DIAGNOSIS — Z51.81 ENCOUNTER FOR MONITORING STATIN THERAPY: ICD-10-CM

## 2023-04-26 DIAGNOSIS — Z79.899 ENCOUNTER FOR MONITORING STATIN THERAPY: ICD-10-CM

## 2023-04-26 DIAGNOSIS — I65.23 BILATERAL CAROTID ARTERY STENOSIS: ICD-10-CM

## 2023-04-26 RX ORDER — ROSUVASTATIN CALCIUM 10 MG/1
TABLET, COATED ORAL
Qty: 90 TABLET | Refills: 0 | Status: SHIPPED | OUTPATIENT
Start: 2023-04-26

## 2023-05-15 ENCOUNTER — LAB ENCOUNTER (OUTPATIENT)
Dept: LAB | Age: 78
End: 2023-05-15
Attending: FAMILY MEDICINE
Payer: MEDICARE

## 2023-05-15 DIAGNOSIS — R73.09 ELEVATED GLUCOSE: ICD-10-CM

## 2023-05-15 DIAGNOSIS — E78.5 HYPERLIPIDEMIA LDL GOAL <70: ICD-10-CM

## 2023-05-15 DIAGNOSIS — I10 BENIGN ESSENTIAL HYPERTENSION: ICD-10-CM

## 2023-05-15 DIAGNOSIS — E55.9 VITAMIN D DEFICIENCY: ICD-10-CM

## 2023-05-15 LAB
BASOPHILS # BLD AUTO: 0.04 X10(3) UL (ref 0–0.2)
BASOPHILS NFR BLD AUTO: 0.5 %
BILIRUB UR QL STRIP.AUTO: NEGATIVE
CHOLEST SERPL-MCNC: 140 MG/DL (ref ?–200)
COLOR UR AUTO: YELLOW
EOSINOPHIL # BLD AUTO: 0.21 X10(3) UL (ref 0–0.7)
EOSINOPHIL NFR BLD AUTO: 2.7 %
ERYTHROCYTE [DISTWIDTH] IN BLOOD BY AUTOMATED COUNT: 12.5 %
EST. AVERAGE GLUCOSE BLD GHB EST-MCNC: 123 MG/DL (ref 68–126)
FASTING PATIENT LIPID ANSWER: YES
GLUCOSE UR STRIP.AUTO-MCNC: NEGATIVE MG/DL
HBA1C MFR BLD: 5.9 % (ref ?–5.7)
HCT VFR BLD AUTO: 42.4 %
HDLC SERPL-MCNC: 76 MG/DL (ref 40–59)
HGB BLD-MCNC: 13.9 G/DL
IMM GRANULOCYTES # BLD AUTO: 0.03 X10(3) UL (ref 0–1)
IMM GRANULOCYTES NFR BLD: 0.4 %
KETONES UR STRIP.AUTO-MCNC: NEGATIVE MG/DL
LDLC SERPL CALC-MCNC: 49 MG/DL (ref ?–100)
LYMPHOCYTES # BLD AUTO: 1.62 X10(3) UL (ref 1–4)
LYMPHOCYTES NFR BLD AUTO: 21.1 %
MCH RBC QN AUTO: 30 PG (ref 26–34)
MCHC RBC AUTO-ENTMCNC: 32.8 G/DL (ref 31–37)
MCV RBC AUTO: 91.6 FL
MONOCYTES # BLD AUTO: 0.67 X10(3) UL (ref 0.1–1)
MONOCYTES NFR BLD AUTO: 8.7 %
NEUTROPHILS # BLD AUTO: 5.09 X10 (3) UL (ref 1.5–7.7)
NEUTROPHILS # BLD AUTO: 5.09 X10(3) UL (ref 1.5–7.7)
NEUTROPHILS NFR BLD AUTO: 66.6 %
NITRITE UR QL STRIP.AUTO: NEGATIVE
NONHDLC SERPL-MCNC: 64 MG/DL (ref ?–130)
PH UR STRIP.AUTO: 7 [PH] (ref 5–8)
PLATELET # BLD AUTO: 268 10(3)UL (ref 150–450)
PROT UR STRIP.AUTO-MCNC: NEGATIVE MG/DL
RBC # BLD AUTO: 4.63 X10(6)UL
RBC UR QL AUTO: NEGATIVE
SP GR UR STRIP.AUTO: 1.02 (ref 1–1.03)
TRIGL SERPL-MCNC: 77 MG/DL (ref 30–149)
TSI SER-ACNC: 3.48 MIU/ML (ref 0.36–3.74)
UROBILINOGEN UR STRIP.AUTO-MCNC: <2 MG/DL
VIT D+METAB SERPL-MCNC: 44.2 NG/ML (ref 30–100)
VLDLC SERPL CALC-MCNC: 11 MG/DL (ref 0–30)
WBC # BLD AUTO: 7.7 X10(3) UL (ref 4–11)

## 2023-05-15 PROCEDURE — 80061 LIPID PANEL: CPT

## 2023-05-15 PROCEDURE — 87147 CULTURE TYPE IMMUNOLOGIC: CPT

## 2023-05-15 PROCEDURE — 87086 URINE CULTURE/COLONY COUNT: CPT

## 2023-05-15 PROCEDURE — 82306 VITAMIN D 25 HYDROXY: CPT

## 2023-05-15 PROCEDURE — 85025 COMPLETE CBC W/AUTO DIFF WBC: CPT

## 2023-05-15 PROCEDURE — 83036 HEMOGLOBIN GLYCOSYLATED A1C: CPT

## 2023-05-15 PROCEDURE — 81001 URINALYSIS AUTO W/SCOPE: CPT

## 2023-05-15 PROCEDURE — 84443 ASSAY THYROID STIM HORMONE: CPT

## 2023-05-18 ENCOUNTER — OFFICE VISIT (OUTPATIENT)
Dept: FAMILY MEDICINE CLINIC | Facility: CLINIC | Age: 78
End: 2023-05-18
Payer: MEDICARE

## 2023-05-18 VITALS
HEIGHT: 64.5 IN | HEART RATE: 56 BPM | WEIGHT: 198 LBS | BODY MASS INDEX: 33.39 KG/M2 | DIASTOLIC BLOOD PRESSURE: 60 MMHG | TEMPERATURE: 98 F | SYSTOLIC BLOOD PRESSURE: 122 MMHG

## 2023-05-18 DIAGNOSIS — I10 BENIGN ESSENTIAL HYPERTENSION: Primary | ICD-10-CM

## 2023-05-18 DIAGNOSIS — M77.11 LATERAL EPICONDYLITIS OF RIGHT ELBOW: ICD-10-CM

## 2023-05-18 DIAGNOSIS — R31.29 MICROSCOPIC HEMATURIA: ICD-10-CM

## 2023-05-18 DIAGNOSIS — I65.23 BILATERAL CAROTID ARTERY STENOSIS: ICD-10-CM

## 2023-05-18 PROCEDURE — 99214 OFFICE O/P EST MOD 30 MIN: CPT | Performed by: FAMILY MEDICINE

## 2023-05-18 RX ORDER — FUROSEMIDE 20 MG/1
20 TABLET ORAL DAILY PRN
Qty: 30 TABLET | Refills: 1 | Status: SHIPPED | OUTPATIENT
Start: 2023-05-18

## 2023-05-18 NOTE — PATIENT INSTRUCTIONS
81 mg enteric coated aspirin once daily. Repeat the urine test in a week or two. Try a tennis elbow strap - Phil has these typically. If no better with the tennis elbow strap, we can send to ortho.

## 2023-05-22 ENCOUNTER — TELEPHONE (OUTPATIENT)
Dept: ORTHOPEDICS CLINIC | Facility: CLINIC | Age: 78
End: 2023-05-22

## 2023-05-22 DIAGNOSIS — M25.521 RIGHT ELBOW PAIN: Primary | ICD-10-CM

## 2023-05-24 ENCOUNTER — TELEPHONE (OUTPATIENT)
Dept: ORTHOPEDICS CLINIC | Facility: CLINIC | Age: 78
End: 2023-05-24

## 2023-05-24 DIAGNOSIS — Z01.89 ENCOUNTER FOR LOWER EXTREMITY COMPARISON IMAGING STUDY: ICD-10-CM

## 2023-05-24 DIAGNOSIS — M25.561 RIGHT KNEE PAIN, UNSPECIFIED CHRONICITY: Primary | ICD-10-CM

## 2023-05-24 NOTE — TELEPHONE ENCOUNTER
Ordered xrays for BOTH knees (left knee for comparison views only.) please schedule both orders with pt. Thanks!     Future Appointments   Date Time Provider Donato Loulou   5/25/2023  1:40 PM HOB XR RM1 HOB XRAY Mehul   6/8/2023  9:40 AM Rachid Chen MD [facility] [id number]   6/19/2023 11:45 AM Ronna Jc MD [facility] [id number]

## 2023-05-25 ENCOUNTER — HOSPITAL ENCOUNTER (OUTPATIENT)
Dept: GENERAL RADIOLOGY | Age: 78
Discharge: HOME | End: 2023-05-25
Attending: ORTHOPAEDIC SURGERY
Payer: MEDICARE

## 2023-05-25 DIAGNOSIS — M25.521 RIGHT ELBOW PAIN: ICD-10-CM

## 2023-05-25 PROCEDURE — 73080 X-RAY EXAM OF ELBOW: CPT | Performed by: ORTHOPAEDIC SURGERY

## 2023-06-05 DIAGNOSIS — F43.0 ACUTE STRESS REACTION: ICD-10-CM

## 2023-06-05 DIAGNOSIS — F41.1 ANXIETY STATE: ICD-10-CM

## 2023-06-05 DIAGNOSIS — I10 BENIGN ESSENTIAL HYPERTENSION: ICD-10-CM

## 2023-06-05 RX ORDER — BISOPROLOL FUMARATE 5 MG/1
TABLET, FILM COATED ORAL
Qty: 90 TABLET | Refills: 1 | Status: SHIPPED | OUTPATIENT
Start: 2023-06-05

## 2023-06-05 RX ORDER — ESCITALOPRAM OXALATE 10 MG/1
TABLET ORAL
Qty: 90 TABLET | Refills: 1 | Status: SHIPPED | OUTPATIENT
Start: 2023-06-05

## 2023-06-05 RX ORDER — HYDROCHLOROTHIAZIDE 25 MG/1
TABLET ORAL
Qty: 90 TABLET | Refills: 1 | Status: SHIPPED | OUTPATIENT
Start: 2023-06-05

## 2023-06-05 NOTE — TELEPHONE ENCOUNTER
LOV:  5/18/2023 for medication follow up  LRF:  10/10/2022 #90 with 1 refill. Please see pended RX and approve if appropriate.

## 2023-06-08 ENCOUNTER — OFFICE VISIT (OUTPATIENT)
Dept: ORTHOPEDICS CLINIC | Facility: CLINIC | Age: 78
End: 2023-06-08
Payer: MEDICARE

## 2023-06-08 ENCOUNTER — HOSPITAL ENCOUNTER (OUTPATIENT)
Dept: GENERAL RADIOLOGY | Age: 78
Discharge: HOME | End: 2023-06-08
Attending: ORTHOPAEDIC SURGERY
Payer: MEDICARE

## 2023-06-08 VITALS — WEIGHT: 198 LBS | HEIGHT: 64.5 IN | BODY MASS INDEX: 33.39 KG/M2

## 2023-06-08 DIAGNOSIS — Z01.89 ENCOUNTER FOR LOWER EXTREMITY COMPARISON IMAGING STUDY: ICD-10-CM

## 2023-06-08 DIAGNOSIS — M17.0 PRIMARY OSTEOARTHRITIS OF BOTH KNEES: Primary | ICD-10-CM

## 2023-06-08 DIAGNOSIS — M25.561 RIGHT KNEE PAIN, UNSPECIFIED CHRONICITY: ICD-10-CM

## 2023-06-08 PROCEDURE — 99204 OFFICE O/P NEW MOD 45 MIN: CPT | Performed by: ORTHOPAEDIC SURGERY

## 2023-06-08 PROCEDURE — 73564 X-RAY EXAM KNEE 4 OR MORE: CPT | Performed by: ORTHOPAEDIC SURGERY

## 2023-06-08 PROCEDURE — 73562 X-RAY EXAM OF KNEE 3: CPT | Performed by: ORTHOPAEDIC SURGERY

## 2023-06-13 ENCOUNTER — TELEPHONE (OUTPATIENT)
Dept: ORTHOPEDICS CLINIC | Facility: CLINIC | Age: 78
End: 2023-06-13

## 2023-06-14 NOTE — TELEPHONE ENCOUNTER
Lvm for patient to call back and schedule Injections with Erica Velasquez . Patient needs to be scheduled 2 weeks ahead to assure that the medication will be there for the appt    Please forward TE back to me with Date,Time and Location.     Osmar Hansen

## 2023-06-19 ENCOUNTER — LAB ENCOUNTER (OUTPATIENT)
Dept: LAB | Age: 78
End: 2023-06-19
Attending: FAMILY MEDICINE
Payer: MEDICARE

## 2023-06-19 ENCOUNTER — OFFICE VISIT (OUTPATIENT)
Dept: ORTHOPEDICS CLINIC | Facility: CLINIC | Age: 78
End: 2023-06-19
Payer: MEDICARE

## 2023-06-19 VITALS — WEIGHT: 198 LBS | HEIGHT: 65 IN | BODY MASS INDEX: 32.99 KG/M2

## 2023-06-19 DIAGNOSIS — M77.11 LATERAL EPICONDYLITIS OF RIGHT ELBOW: Primary | ICD-10-CM

## 2023-06-19 DIAGNOSIS — R31.29 MICROSCOPIC HEMATURIA: ICD-10-CM

## 2023-06-19 LAB
BILIRUB UR QL STRIP.AUTO: NEGATIVE
COLOR UR AUTO: YELLOW
GLUCOSE UR STRIP.AUTO-MCNC: NEGATIVE MG/DL
KETONES UR STRIP.AUTO-MCNC: NEGATIVE MG/DL
NITRITE UR QL STRIP.AUTO: NEGATIVE
PH UR STRIP.AUTO: 7 [PH] (ref 5–8)
PROT UR STRIP.AUTO-MCNC: NEGATIVE MG/DL
RBC UR QL AUTO: NEGATIVE
SP GR UR STRIP.AUTO: 1.01 (ref 1–1.03)
UROBILINOGEN UR STRIP.AUTO-MCNC: <2 MG/DL

## 2023-06-19 PROCEDURE — 81001 URINALYSIS AUTO W/SCOPE: CPT

## 2023-06-19 PROCEDURE — 87086 URINE CULTURE/COLONY COUNT: CPT

## 2023-06-22 ENCOUNTER — PATIENT MESSAGE (OUTPATIENT)
Dept: OCCUPATIONAL MEDICINE | Age: 78
End: 2023-06-22

## 2023-06-26 ENCOUNTER — APPOINTMENT (OUTPATIENT)
Dept: OCCUPATIONAL MEDICINE | Age: 78
End: 2023-06-26
Attending: ORTHOPAEDIC SURGERY
Payer: MEDICARE

## 2023-06-29 ENCOUNTER — TELEPHONE (OUTPATIENT)
Dept: PHYSICAL THERAPY | Facility: HOSPITAL | Age: 78
End: 2023-06-29

## 2023-06-29 ENCOUNTER — TELEPHONE (OUTPATIENT)
Dept: OCCUPATIONAL MEDICINE | Age: 78
End: 2023-06-29

## 2023-06-30 ENCOUNTER — APPOINTMENT (OUTPATIENT)
Dept: OCCUPATIONAL MEDICINE | Age: 78
End: 2023-06-30
Attending: ORTHOPAEDIC SURGERY
Payer: MEDICARE

## 2023-07-05 ENCOUNTER — OFFICE VISIT (OUTPATIENT)
Dept: OCCUPATIONAL MEDICINE | Age: 78
End: 2023-07-05
Attending: ORTHOPAEDIC SURGERY
Payer: MEDICARE

## 2023-07-05 ENCOUNTER — OFFICE VISIT (OUTPATIENT)
Dept: ORTHOPEDICS CLINIC | Facility: CLINIC | Age: 78
End: 2023-07-05
Payer: MEDICARE

## 2023-07-05 VITALS — BODY MASS INDEX: 32.99 KG/M2 | WEIGHT: 198 LBS | HEIGHT: 65 IN

## 2023-07-05 DIAGNOSIS — M77.11 LATERAL EPICONDYLITIS OF RIGHT ELBOW: Primary | ICD-10-CM

## 2023-07-05 DIAGNOSIS — M17.0 PRIMARY OSTEOARTHRITIS OF BOTH KNEES: Primary | ICD-10-CM

## 2023-07-05 PROCEDURE — 97110 THERAPEUTIC EXERCISES: CPT

## 2023-07-05 PROCEDURE — 97165 OT EVAL LOW COMPLEX 30 MIN: CPT

## 2023-07-05 PROCEDURE — 20610 DRAIN/INJ JOINT/BURSA W/O US: CPT | Performed by: PHYSICIAN ASSISTANT

## 2023-07-05 NOTE — PROGRESS NOTES
[facility] Progress Note      Chief Complaint:  Bilateral knee pain      Subjective: Salvador Edmond is a 70year old female who is here for follow-up of her bilateral knee pain. She is here today for Monovisc injections to both knees. She has had these in the past, approximately 6 years ago which did provide relief. She does note some worsening pain over the weekend especially in the right knee. Objective: Exam of bilateral knees and lower extremities reveals the overlying skin is intact. There is no palpable effusion. Sensation is present to light touch. Assessment: Bilateral knee degenerative joint disease      Plan: She would like to proceed with the Monovisc injections today and this is reasonable. She is able to repeat these every 6 months as needed. She will call with any questions or concerns in the interim. Risk, benefits, and alternatives to the Monovisc injection was discussed including but not limited to risk of needle infection, flareup, and failure to improve. They would like to proceed and under meticulous sterile technique, 4 cc of Xylocaine was used anesthetize the lateral patellofemoral joint of the bilateral knee. Then through an 18-gauge needle, the entire contents of the Monovisc injection was administered with free flow fluid. They tolerated the injection well and a Band-Aid was applied. They will follow-up with any adverse reactions.           Salomón Her PA-C  Orthopedic Surgery   [facility]  [address]uzi Hernandez, [address]   t: [phone]  f: [phone]

## 2023-07-06 NOTE — PROGRESS NOTES
Diagnosis:      right lateral epi, right shoulder impingement, RSF tenosynovitis   Referring Provider: Walter Dawkins  Date of Evaluation:    7/5/23    Precautions:  None  Next MD visit: 8/28/23  Date of Surgery: n/a   Insurance Primary/Secondary: Viri Valencia / [employer] Hask[employer]     # Auth Visits: n/a          Order Date:  6/19/2023  Authorizing Provider:   Sandie Asencio     Procedure:  OP REFERRAL TO [address] [place] [address] [[id number]]         Order #:   [id number]  Qty:  1     Priority:  Routine                   Class:   IHP - RFL     Standing Interval:          Standing Occurrences:          Expires on:            Expected by:    Associated DX:  Lateral epicondylitis of right elbow (M77.11)     Order summary:  IHP - RFL, Routine, 8 visits  Occupational  Therapy Area of Concentration: Orthopedic  Occupational Therapy Ortho: UE  If the patient can be seen sooner with a PT vs an OT, can we cancel this order and replace with a PT order? Yes         Comments:  Right lateral epicondylitis. Eval and treat. Home exercise program.  Modalities as needed. Subjective: Asking about purchasing a hot pack.     Pain:    4-5/10 with movement      Objective:      Date 7/5/23 corrected  7/7/23     Visit # 1  2     # of visits authorized: N/a       # of visits in OT POC:  12  12     Evaluation Initial X       Progress Report written         Manual Therapy         STM    x      cupping    lateral elbow      IASTM         Ther ex          wrist extensor stretches 5x  5x     Wrist flexor stretches       Radial nerve glides 5x  5x      middle and lower traps strengthening    10x     Serratus ant strengthening          External rotator strengthening          shoulder stabilizers    x      pendulum ex    10x     Postural re-ed  x    Doorway stretches, lat stretches       HEP/  Patient education topics See table below  see below; use of moist heat; wrist splint; posture correction     Therapeutic Activity Objective measurements taken and reviewed with patient  see above       Neuromuscular Re-education                                                   Orthotic fitting and training         Taping         Modalities Hot pack 5 minutes to right lateral elbow  Hot pack to right shoulder for 8 minutes; Ultrasound:  3mHz  0.8w/cm2  100%  to right lateral elbow  for 8 minutes. X= performed this date as previously outlined    Assessment: Patient presents with improvements in pain complaints. However, she continues with likely referred pain from shoulder, along with tight Internal rotators of the shoulder, which indicate and support the need for ordered OT. Goals: (to be met in 12 visits)  Patient will be independent and compliant with comprehensive HEP to maintain progress achieved in OT. Patient to verbalize and demonstrate understanding of aggravating and alleviating factors related to lateral epicondylitis to decrease risk of recurrence for improved functional use of right UE. Patient will present with increase in force tolerance of gripping with the right hand in the \"stress position\" to 40 # of force with no more than 1/10 pain to allow for ease with gripping work tools   Patient will report no more than 1/10 pain in right shoulder with upward reach into the cupboard. Patient will demonstrate independent awareness of postural corrections to provide proximal stability for distal limb movements. Plan:   Frequency / Duration: Patient will be seen for 2 x/week or a total of 12 visits over a 90 day period. Treatment will include: Manual Therapy, Neuromuscular Re-education, Self-Care Home Management, Therapeutic Activities, Therapeutic Exercise, Home Exercise Program instruction, Modalities to include: Ultrasound and hot packs,  and taping and custom splinting. Next session: charmaine scap exercises; postural re-ed; ER isometrics; door stretches.       HEP Date issued  Date amended Date discharged  Comments (HEP2Go code if used)   Wrist muscle stretches 7/5/23         Radial nerve glides 7/5/23         Pendulum ex 7/7/23         Middle, lower traps 7/7/23         Chicken wings 7/7/23         Radial nerve glides 7/7/23                        Charges: 1 US,  1 TE, 1 MT         Total Timed Treatment: 40 minutes    Total Treatment Time: 45 minutes  Oh Holley, BETTES, OTR/L, CHT

## 2023-07-07 ENCOUNTER — OFFICE VISIT (OUTPATIENT)
Dept: OCCUPATIONAL MEDICINE | Age: 78
End: 2023-07-07
Attending: ORTHOPAEDIC SURGERY
Payer: MEDICARE

## 2023-07-07 PROCEDURE — 97035 APP MDLTY 1+ULTRASOUND EA 15: CPT

## 2023-07-07 PROCEDURE — 97110 THERAPEUTIC EXERCISES: CPT

## 2023-07-07 PROCEDURE — 97140 MANUAL THERAPY 1/> REGIONS: CPT

## 2023-07-07 NOTE — PROGRESS NOTES
Diagnosis:      right lateral epi, right shoulder impingement, RSF tenosynovitis   Referring Provider: Jose Oro  Date of Evaluation:    7/5/23    Precautions:  None  Next MD visit: 8/28/23  Date of Surgery: n/a   Insurance Primary/Secondary: Duarte Samuels / Brando Lira [employer]     # Auth Visits: n/a          Order Date:  6/19/2023  Authorizing Provider:   Corinne Largo     Procedure:  OP REFERRAL TO [address] [place] [address] [[id number]]         Order #:   [id number]  Qty:  1     Priority:  Routine                   Class:   IHP - RFL     Standing Interval:          Standing Occurrences:          Expires on:            Expected by:    Associated DX:  Lateral epicondylitis of right elbow (M77.11)     Order summary:  IHP - RFL, Routine, 8 visits  Occupational  Therapy Area of Concentration: Orthopedic  Occupational Therapy Ortho: UE  If the patient can be seen sooner with a PT vs an OT, can we cancel this order and replace with a PT order? Yes         Comments:  Right lateral epicondylitis. Eval and treat. Home exercise program.  Modalities as needed. Subjective: Reports elbow pain has resolved, but shoulder pain continues.     Pain:    shoulder: 2/10 at rest; 3/10 with resistance      Objective:      Date 7/5/23 corrected  7/7/23 7/18/23   Visit # 1  2  3   # of visits authorized: N/a       # of visits in OT POC:  12  12  12   Evaluation Initial X       Progress Report written         Manual Therapy         STM    x      cupping    lateral elbow      IASTM         Ther ex          wrist extensor stretches 5x  5x     Wrist flexor stretches       Radial nerve glides 5x  5x      middle and lower traps strengthening    10x  middle traps   Serratus ant strengthening      ball on wall    External rotator strengthening      yellow band    shoulder stabilizers    x  x    pendulum ex    10x  10x   Postural re-ed  x x   Doorway stretches, lat stretches   Doorway, bolster          HEP/  Patient education topics See table below  see below; use of moist heat; wrist splint; posture correction  use of heat, limit Upper traps activation; postural correction in mirror   Therapeutic Activity                                                                      Objective measurements taken and reviewed with patient  see above       Neuromuscular Re-education                                                   Orthotic fitting and training         Taping         Modalities Hot pack 5 minutes to right lateral elbow  Hot pack to right shoulder for 8 minutes; Ultrasound:  3mHz  0.8w/cm2  100%  to right lateral elbow  for 8 minutes. Ultrasound:  3mHz  0.8w/cm2  100%  to right shoulder/RC for 8 minutes. X= performed this date as previously outlined    Assessment: Patient presents with tendency to activate upper traps for shoulder elevation. Likely due to weakness and muscle imbalance in rotator cuff. Elbow pain appears to have resolved, likely was due to referred pain from Supraspinatus. Goals: (to be met in 12 visits)  Patient will be independent and compliant with comprehensive HEP to maintain progress achieved in OT. (Progressing)  Patient to verbalize and demonstrate understanding of aggravating and alleviating factors related to lateral epicondylitis to decrease risk of recurrence for improved functional use of right UE. (Progressing)  Patient will present with increase in force tolerance of gripping with the right hand in the \"stress position\" to 40 # of force with no more than 1/10 pain to allow for ease with gripping work tools (Progressing)   Patient will report no more than 1/10 pain in right shoulder with upward reach into the cupboard. (Progressing)   Patient will demonstrate independent awareness of postural corrections to provide proximal stability for distal limb movements. (Progressing)      Plan:   Frequency / Duration: Patient will be seen for 2 x/week or a total of 12 visits over a 90 day period.   Treatment will include: Manual Therapy, Neuromuscular Re-education, Self-Care Home Management, Therapeutic Activities, Therapeutic Exercise, Home Exercise Program instruction, Modalities to include: Ultrasound and hot packs,  and taping and custom splinting. Next session: charmaine scap exercises; postural re-ed; ER isometrics; door stretches. Issue ER sidelying and upper traps stretches to HEP. Computer work station set up.       HEP Date issued  Date amended Date discharged  Comments (HEP2Go code if used)   Wrist muscle stretches 7/5/23         Radial nerve glides 7/5/23         Pendulum ex 7/7/23         Middle, lower traps 7/7/23         Chicken wings 7/7/23         Radial nerve glides 7/7/23                        Charges: 1 US,  2 TE     Total Timed Treatment: 45 minutes    Total Treatment Time: 45 minutes  HUONG Santos, OTR/L, CHT

## 2023-07-18 ENCOUNTER — OFFICE VISIT (OUTPATIENT)
Dept: OCCUPATIONAL MEDICINE | Age: 78
End: 2023-07-18
Attending: ORTHOPAEDIC SURGERY
Payer: MEDICARE

## 2023-07-18 PROCEDURE — 97035 APP MDLTY 1+ULTRASOUND EA 15: CPT

## 2023-07-18 PROCEDURE — 97110 THERAPEUTIC EXERCISES: CPT

## 2023-07-18 NOTE — PROGRESS NOTES
Diagnosis:      right lateral epi, right shoulder impingement, RSF tenosynovitis   Referring Provider: Kylah Verdin  Date of Evaluation:    7/5/23    Precautions:  None  Next MD visit: 8/28/23  Date of Surgery: n/a   Insurance Primary/Secondary: Daily Espinal / Catrina Perkins [employer]     # Auth Visits: n/a          Order Date:  6/19/2023  Authorizing Provider:   Honey Kulkarni     Procedure:  OP REFERRAL TO [address] [place] [address] [[id number]]         Order #:   [id number]  Qty:  1     Priority:  Routine                   Class:   IHP - RFL     Standing Interval:          Standing Occurrences:          Expires on:            Expected by:    Associated DX:  Lateral epicondylitis of right elbow (M77.11)     Order summary:  IHP - RFL, Routine, 8 visits  Occupational  Therapy Area of Concentration: Orthopedic  Occupational Therapy Ortho: UE  If the patient can be seen sooner with a PT vs an OT, can we cancel this order and replace with a PT order? Yes         Comments:  Right lateral epicondylitis. Eval and treat. Home exercise program.  Modalities as needed. Subjective: Reports no elbow pain, but still with upper arm pain. Reports just had injection into SF for trigger finger.     Pain:    shoulder: 2/10 at rest; 3/10 with resistance      Objective:      Date 7/5/23 corrected  7/7/23 7/18/23 7/20/23   Visit # 1  2  3 4   # of visits authorized: N/a        # of visits in OT POC:  12  12  12 12   Evaluation Initial X        Progress Report written          Manual Therapy          STM    x       cupping    lateral elbow       IASTM          Ther ex           wrist extensor stretches 5x  5x      Wrist flexor stretches        Radial nerve glides 5x  5x       middle and lower traps strengthening    10x  middle traps    Serratus ant strengthening      ball on wall Supine, 2 sets of 10, 1#; ball on wall, bilateral alternating on mat    External rotator strengthening      yellow band Sidelying 1#, 2 sets of 10 right    shoulder stabilizers x  x x    pendulum ex    10x  10x    Postural re-ed  x x x   Doorway stretches, lat stretches   Doorway, bolster    Cervical general stretches    Right upper traps and Yassine [place]    HEP/  Patient education topics See table below  see below; use of moist heat; wrist splint; posture correction  use of heat, limit Upper traps activation; postural correction in mirror See below   Therapeutic Activity                                                                             Objective measurements taken and reviewed with patient  see above        Neuromuscular Re-education                                                        Orthotic fitting and training          Taping          Modalities Hot pack 5 minutes to right lateral elbow  Hot pack to right shoulder for 8 minutes; Ultrasound:  3mHz  0.8w/cm2  100%  to right lateral elbow  for 8 minutes. Ultrasound:  3mHz  0.8w/cm2  100%  to right shoulder/RC for 8 minutes. Ultrasound:  3mHz  0.8w/cm2  100%  to right shoulder/RC for 8 minutes. X= performed this date as previously outlined    Assessment: Patient presents with improving UE symptoms. Likely referred pain from supraspinatus into the upper arm. SA and ER's are very weak and rounded shoulders add to poor biomechanics in the shoulder girdle. Goals: (to be met in 12 visits)  Patient will be independent and compliant with comprehensive HEP to maintain progress achieved in OT. (Progressing)  Patient to verbalize and demonstrate understanding of aggravating and alleviating factors related to lateral epicondylitis to decrease risk of recurrence for improved functional use of right UE. (Progressing)  Patient will present with increase in force tolerance of gripping with the right hand in the \"stress position\" to 40 # of force with no more than 1/10 pain to allow for ease with gripping work tools (Progressing)   Patient will report no more than 1/10 pain in right shoulder with upward reach into the cupboard. (Progressing)   Patient will demonstrate independent awareness of postural corrections to provide proximal stability for distal limb movements. (Progressing)      Plan:   Frequency / Duration: Patient will be seen for 2 x/week or a total of 12 visits over a 90 day period. Treatment will include: Manual Therapy, Neuromuscular Re-education, Self-Care Home Management, Therapeutic Activities, Therapeutic Exercise, Home Exercise Program instruction, Modalities to include: Ultrasound and hot packs,  and taping and custom splinting. Next session: charmaine scap exercises; postural re-ed; ER isometrics; door stretches. Issue ER sidelying and upper traps stretches to HEP. giddy work station set [employer].       HEP Date issued  Date amended Date discharged  Comments (HEP2Go code if used)   Wrist muscle stretches 7/5/23         Radial nerve glides 7/5/23         Pendulum ex 7/7/23         Middle, lower traps 7/7/23         Chicken wings 7/7/23         Radial nerve glides 7/7/23          Serratus ant: slides, ball on wall; ER sidelying, upper trap stretches 7/20/23            Charges: 1 US,  2 TE     Total Timed Treatment: 45 minutes    Total Treatment Time: 45 minutes  HUONG Martin, OTR/L, CHT

## 2023-07-20 ENCOUNTER — OFFICE VISIT (OUTPATIENT)
Dept: ORTHOPEDICS CLINIC | Facility: CLINIC | Age: 78
End: 2023-07-20
Payer: MEDICARE

## 2023-07-20 ENCOUNTER — OFFICE VISIT (OUTPATIENT)
Dept: OCCUPATIONAL MEDICINE | Age: 78
End: 2023-07-20
Attending: ORTHOPAEDIC SURGERY
Payer: MEDICARE

## 2023-07-20 VITALS — BODY MASS INDEX: 32.99 KG/M2 | WEIGHT: 198 LBS | HEIGHT: 65 IN

## 2023-07-20 DIAGNOSIS — M65.351 TRIGGER LITTLE FINGER OF RIGHT HAND: Primary | ICD-10-CM

## 2023-07-20 PROCEDURE — 20550 NJX 1 TENDON SHEATH/LIGAMENT: CPT | Performed by: PHYSICIAN ASSISTANT

## 2023-07-20 PROCEDURE — 97035 APP MDLTY 1+ULTRASOUND EA 15: CPT

## 2023-07-20 PROCEDURE — 99213 OFFICE O/P EST LOW 20 MIN: CPT | Performed by: PHYSICIAN ASSISTANT

## 2023-07-20 PROCEDURE — 97110 THERAPEUTIC EXERCISES: CPT

## 2023-07-20 RX ORDER — BETAMETHASONE SODIUM PHOSPHATE AND BETAMETHASONE ACETATE 3; 3 MG/ML; MG/ML
6 INJECTION, SUSPENSION INTRA-ARTICULAR; INTRALESIONAL; INTRAMUSCULAR; SOFT TISSUE ONCE
Status: COMPLETED | OUTPATIENT
Start: 2023-07-20 | End: 2023-07-20

## 2023-07-20 RX ADMIN — BETAMETHASONE SODIUM PHOSPHATE AND BETAMETHASONE ACETATE 6 MG: 3; 3 INJECTION, SUSPENSION INTRA-ARTICULAR; INTRALESIONAL; INTRAMUSCULAR; SOFT TISSUE at 10:30:00

## 2023-07-20 NOTE — PROGRESS NOTES
Diagnosis:      right lateral epi, right shoulder impingement, RSF tenosynovitis   Referring Provider: Nomi Calles  Date of Evaluation:    7/5/23    Precautions:  None  Next MD visit: 8/28/23  Date of Surgery: n/a   Insurance Primary/Secondary: Carmen Dunlap / Dariel LOPEZ     # Auth Visits: n/a          Order Date:  6/19/2023  Authorizing Provider:   Beena Flynn     Procedure:  OP REFERRAL TO [address] [place] [address] THERAPY [[id number]]         Order #:   [id number]  Qty:  1     Priority:  Routine                   Class:   IHP - RFL     Standing Interval:          Standing Occurrences:          Expires on:            Expected by:    Associated DX:  Lateral epicondylitis of right elbow (M77.11)     Order summary:  IHP - RFL, Routine, 8 visits  Occupational  Therapy Area of Concentration: Orthopedic  Occupational Therapy Ortho: UE  If the patient can be seen sooner with a PT vs an OT, can we cancel this order and replace with a PT order? Yes         Comments:  Right lateral epicondylitis. Eval and treat. Home exercise program.  Modalities as needed. Subjective: Reports elbow and distal pains have resolved, but still with slight pain in anterior shoulder.     Pain:    shoulder: 2/10 at rest; 3/10 with resistance      Objective:      Date 7/5/23 corrected  7/7/23 7/18/23 7/20/23 7/25/23   Visit # 1  2  3 4 5   # of visits authorized: N/a         # of visits in OT POC:  12  12  12 12 12   Evaluation Initial X         Progress Report written           Manual Therapy           STM    x        cupping    lateral elbow        IASTM           Ther ex            wrist extensor stretches 5x  5x       Wrist flexor stretches         Radial nerve glides 5x  5x        middle and lower traps strengthening    10x  middle traps  Middle traps   Serratus ant strengthening      ball on wall Supine, 2 sets of 10, 1#; ball on wall, bilateral alternating on mat     External rotator strengthening      yellow band Sidelying 1#, 2 sets of 10 right Red tubing, 2 sets of 10    shoulder stabilizers    x  x x IR red tubing, 1 set of 10    pendulum ex    10x  10x     Postural re-ed  x x x x   Doorway stretches, lat stretches   Doorway, bolster  bolster   Cervical general stretches    Right upper traps and [employer] reviewed   Bulk putty     Yellow, roll,  and transfer, pinch and search for beads   Closed chain ex for shoulder motions     Window washing    HEP/  Patient education topics See table below  see below; use of moist heat; wrist splint; posture correction  use of heat, limit Upper traps activation; postural correction in mirror See below Anatomy of injury   Therapeutic Activity                                                                                    Objective measurements taken and reviewed with patient  see above         Neuromuscular Re-education                                                             Orthotic fitting and training           Taping           Modalities Hot pack 5 minutes to right lateral elbow  Hot pack to right shoulder for 8 minutes; Ultrasound:  3mHz  0.8w/cm2  100%  to right lateral elbow  for 8 minutes. Ultrasound:  3mHz  0.8w/cm2  100%  to right shoulder/RC for 8 minutes. Ultrasound:  3mHz  0.8w/cm2  100%  to right shoulder/RC for 8 minutes. Ultrasound:  3mHz  0.8w/cm2  100%  to right shoulder/RC/supraspinatus for 8 minutes. X= performed this date as previously outlined    Assessment: Patient presents with improving UE pain. Likely the culprit is the tight and overactive upper traps, which has been pulling the scapula and shoulder girdle into mal-alignment, causing referred pain through and from the supraspinatus. Goals: (to be met in 12 visits)  Patient will be independent and compliant with comprehensive HEP to maintain progress achieved in OT.  (Progressing)  Patient to verbalize and demonstrate understanding of aggravating and alleviating factors related to lateral epicondylitis to decrease risk of recurrence for improved functional use of right UE. (Progressing)  Patient will present with increase in force tolerance of gripping with the right hand in the \"stress position\" to 40 # of force with no more than 1/10 pain to allow for ease with gripping work tools (Progressing)   Patient will report no more than 1/10 pain in right shoulder with upward reach into the cupboard. (Progressing)   Patient will demonstrate independent awareness of postural corrections to provide proximal stability for distal limb movements. (Progressing)      Plan:   Frequency / Duration: Patient will be seen for 2 x/week or a total of 12 visits over a 90 day period. Treatment will include: Manual Therapy, Neuromuscular Re-education, Self-Care Home Management, Therapeutic Activities, Therapeutic Exercise, Home Exercise Program instruction, Modalities to include: Ultrasound and hot packs,  and taping and custom splinting. Next session: charmaine scap exercises; postural re-ed; ER isometrics; door stretches. Issue ER sidelying and upper traps stretches to HEP.        HEP Date issued  Date amended Date discharged  Comments (HEP2Go code if used)   Wrist muscle stretches 7/5/23         Radial nerve glides 7/5/23         Pendulum ex 7/7/23         Middle, lower traps 7/7/23         Chicken wings 7/7/23         Radial nerve glides 7/7/23          Serratus ant: slides, ball on wall; ER sidelying, upper trap stretches 7/20/23            Charges: 1 US,  2 TE     Total Timed Treatment: 45 minutes    Total Treatment Time: 45 minutes  HUONG Ozuna, OTR/L, CHT

## 2023-07-21 ENCOUNTER — TELEPHONE (OUTPATIENT)
Dept: FAMILY MEDICINE CLINIC | Facility: CLINIC | Age: 78
End: 2023-07-21

## 2023-07-25 ENCOUNTER — OFFICE VISIT (OUTPATIENT)
Dept: OCCUPATIONAL MEDICINE | Age: 78
End: 2023-07-25
Attending: ORTHOPAEDIC SURGERY
Payer: MEDICARE

## 2023-07-25 PROCEDURE — 97035 APP MDLTY 1+ULTRASOUND EA 15: CPT

## 2023-07-25 PROCEDURE — 97110 THERAPEUTIC EXERCISES: CPT

## 2023-07-25 NOTE — PROGRESS NOTES
Diagnosis:      right lateral epi, right shoulder impingement, RSF tenosynovitis   Referring Provider: Nomi Calles  Date of Evaluation:    7/5/23    Precautions:  None  Next MD visit: 8/28/23  Date of Surgery: n/a   Insurance Primary/Secondary: Carmen Dunlap / Dariel LOPEZ     # Auth Visits: n/a          Order Date:  6/19/2023  Authorizing Provider:   Beena Flynn     Procedure:  OP REFERRAL TO [address] [place] [address] [[id number]]         Order #:   [id number]  Qty:  1     Priority:  Routine                   Class:   IHP - RFL     Standing Interval:          Standing Occurrences:          Expires on:            Expected by:    Associated DX:  Lateral epicondylitis of right elbow (M77.11)     Order summary:  IHP - RFL, Routine, 8 visits  Occupational  Therapy Area of Concentration: Orthopedic  Occupational Therapy Ortho: UE  If the patient can be seen sooner with a PT vs an OT, can we cancel this order and replace with a PT order? Yes         Comments:  Right lateral epicondylitis. Eval and treat. Home exercise program.  Modalities as needed. Subjective: \"I think all of this is paying off. \"    Pain:    0/10      Objective:      Date  7/7/23 7/18/23 7/20/23 7/25/23 7/27/23   Visit #  2  3 4 5 6   # of visits authorized:          # of visits in OT POC:   12  12 12 12 12   Evaluation          Progress Report written          Manual Therapy          STM  x         cupping  lateral elbow         IASTM          Ther ex           wrist extensor stretches  5x        Wrist flexor stretches         Radial nerve glides  5x         middle and lower traps strengthening  10x  middle traps  Middle traps    Serratus ant strengthening    ball on wall Supine, 2 sets of 10, 1#; ball on wall, bilateral alternating on mat      External rotator strengthening    yellow band Sidelying 1#, 2 sets of 10 right Red tubing, 2 sets of 10 Red tubing, 2 sets of 15    shoulder stabilizers  x  x x IR red tubing, 1 set of 10 IF red tubing, 2 sets of 10    pendulum ex  10x  10x   x   Postural re-ed x x x x x   Doorway stretches, lat stretches  Doorway, bolster  bolster bolster   Cervical general stretches   Right upper traps and Lev Scap reviewed    Bulk putty    Yellow, roll,  and transfer, pinch and search for beads Yellow, roll,  and transfer, pinch and search for beads   Tubing      Red: shoulder pulls and punches: 1 set of 10 each of four directions   Closed chain ex for shoulder motions    Window washing     HEP/  Patient education topics  see below; use of moist heat; wrist splint; posture correction  use of heat, limit Upper traps activation; postural correction in mirror See below Anatomy of injury Anatomy of shoulder, arthritic changes   Therapeutic Activity                                                                             Objective measurements taken and reviewed with patient          Neuromuscular Re-education                                                      Orthotic fitting and training          Taping          Modalities  Hot pack to right shoulder for 8 minutes; Ultrasound:  3mHz  0.8w/cm2  100%  to right lateral elbow  for 8 minutes. Ultrasound:  3mHz  0.8w/cm2  100%  to right shoulder/RC for 8 minutes. Ultrasound:  3mHz  0.8w/cm2  100%  to right shoulder/RC for 8 minutes. Ultrasound:  3mHz  0.8w/cm2  100%  to right shoulder/RC/supraspinatus for 8 minutes. Ultrasound:  3mHz  0.8w/cm2  100%  to right shoulder/RC/supraspinatus for 8 minutes. X= performed this date as previously outlined    Assessment: Patient presents with improving UE pain and symptoms, but with arthritic changes in shoulder as noted in xray, she is at risk for ongoing impingement. Goals: (to be met in 12 visits)  Patient will be independent and compliant with comprehensive HEP to maintain progress achieved in OT.  (Progressing)  Patient to verbalize and demonstrate understanding of aggravating and alleviating factors related to lateral epicondylitis to decrease risk of recurrence for improved functional use of right UE. (Progressing)  Patient will present with increase in force tolerance of gripping with the right hand in the \"stress position\" to 40 # of force with no more than 1/10 pain to allow for ease with gripping work tools (Progressing)   Patient will report no more than 1/10 pain in right shoulder with upward reach into the cupboard. (Progressing)   Patient will demonstrate independent awareness of postural corrections to provide proximal stability for distal limb movements. (Progressing)      Plan:   Frequency / Duration: Patient will be seen for 2 x/week or a total of 12 visits over a 90 day period. Treatment will include: Manual Therapy, Neuromuscular Re-education, Self-Care Home Management, Therapeutic Activities, Therapeutic Exercise, Home Exercise Program instruction, Modalities to include: Ultrasound and hot packs,  and taping and custom splinting. Next session: charmaine scap exercises; postural re-ed; RC strengthening; check her theraband from home and upgrade HEP as indicated.       HEP Date issued  Date amended Date discharged  Comments (HEP2Go code if used)   Wrist muscle stretches 7/5/23         Radial nerve glides 7/5/23         Pendulum ex 7/7/23         Middle, lower traps 7/7/23         Chicken wings 7/7/23         Radial nerve glides 7/7/23          Serratus ant: slides, ball on wall; ER sidelying, upper trap stretches 7/20/23            Charges: 1 US,  2 TE     Total Timed Treatment: 45 minutes    Total Treatment Time: 45 minutes  HUONG Renteria, OTR/L, CHT

## 2023-07-27 ENCOUNTER — OFFICE VISIT (OUTPATIENT)
Dept: OCCUPATIONAL MEDICINE | Age: 78
End: 2023-07-27
Attending: ORTHOPAEDIC SURGERY
Payer: MEDICARE

## 2023-07-27 PROCEDURE — 97110 THERAPEUTIC EXERCISES: CPT

## 2023-07-27 PROCEDURE — 97035 APP MDLTY 1+ULTRASOUND EA 15: CPT

## 2023-07-27 NOTE — PROGRESS NOTES
Diagnosis:      right lateral epi, right shoulder impingement, RSF tenosynovitis   Referring Provider: Antoinette Leggett  Date of Evaluation:    7/5/23    Precautions:  None  Next MD visit: 8/28/23  Date of Surgery: n/a   Insurance Primary/Secondary: Irina Montejo / [employer]     # Auth Visits: n/a          Order Date:  6/19/2023  Authorizing Provider:   Jackson Luong     Procedure:  OP REFERRAL TO [address] [place] [address] [[id number]]         Order #:   [id number]  Qty:  1     Priority:  Routine                   Class:   IHP - RFL     Standing Interval:          Standing Occurrences:          Expires on:            Expected by:    Associated DX:  Lateral epicondylitis of right elbow (M77.11)     Order summary:  IHP - RFL, Routine, 8 visits  Occupational  Therapy Area of Concentration: Orthopedic  Occupational Therapy Ortho: UE  If the patient can be seen sooner with a PT vs an OT, can we cancel this order and replace with a PT order? Yes         Comments:  Right lateral epicondylitis. Eval and treat. Home exercise program.  Modalities as needed. Subjective: Reports shoulder and arm pain are much improved.     Pain:    0/10      Objective:      Date  7/7/23 7/18/23 7/20/23 7/25/23 7/27/23 8/1/23   Visit #  2  3 4 5 6 7   # of visits authorized:           # of visits in OT POC:   12  12 12 12 12 12   Evaluation           Progress Report written           Manual Therapy           STM  x          cupping  lateral elbow          IASTM           Ther ex            wrist extensor stretches  5x         Wrist flexor stretches          Radial nerve glides  5x          middle and lower traps strengthening  10x  middle traps  Middle traps     Serratus ant strengthening    ball on wall Supine, 2 sets of 10, 1#; ball on wall, bilateral alternating on mat       External rotator strengthening    yellow band Sidelying 1#, 2 sets of 10 right Red tubing, 2 sets of 10 Red tubing, 2 sets of 15 Red tubing, 2 sets of 15    shoulder stabilizers  x  x x IR red tubing, 1 set of 10 IR red tubing, 2 sets of 10 IR red tubing, 2 sets of 10    pendulum ex  10x  10x   x x   Postural re-ed x x x x x x   Doorway stretches, lat stretches  Doorway, bolster  bolster bolster bolster   Cervical general stretches   Right upper traps and [employer] reviewed  reviewed   Bulk putty    Yellow, roll,  and transfer, pinch and search for beads Yellow, roll,  and transfer, pinch and search for beads Yellow    Tubing      Red: shoulder pulls and punches: 1 set of 10 each of four directions Red: shoulder pulls and punches: 2 sets of 10 each of four directions   Closed chain ex for shoulder motions    Window washing  Window washing    HEP/  Patient education topics  see below; use of moist heat; wrist splint; posture correction  use of heat, limit Upper traps activation; postural correction in mirror See below Anatomy of injury Anatomy of shoulder, arthritic changes US mechanics   Therapeutic Activity                                                                                    Objective measurements taken and reviewed with patient           Neuromuscular Re-education                                                           Orthotic fitting and training           Taping           Modalities  Hot pack to right shoulder for 8 minutes; Ultrasound:  3mHz  0.8w/cm2  100%  to right lateral elbow  for 8 minutes. Ultrasound:  3mHz  0.8w/cm2  100%  to right shoulder/RC for 8 minutes. Ultrasound:  3mHz  0.8w/cm2  100%  to right shoulder/RC for 8 minutes. Ultrasound:  3mHz  0.8w/cm2  100%  to right shoulder/RC/supraspinatus for 8 minutes. Ultrasound:  3mHz  0.8w/cm2  100%  to right shoulder/RC/supraspinatus for 8 minutes. Ultrasound:  3mHz  0.8w/cm2  100%  to right shoulder/RC/supraspinatus for 8 minutes.    X= performed this date as previously outlined    Assessment: Patient presents with improving awareness of scapular movements, but still initiates UE movements with upper trap/elevation of the scapula versus normal distal initiation. Anticipate she will be ready for planned dc next session. Goals: (to be met in 12 visits)  Patient will be independent and compliant with comprehensive HEP to maintain progress achieved in OT. (Progressing)  Patient to verbalize and demonstrate understanding of aggravating and alleviating factors related to lateral epicondylitis to decrease risk of recurrence for improved functional use of right UE. (Progressing)  Patient will present with increase in force tolerance of gripping with the right hand in the \"stress position\" to 40 # of force with no more than 1/10 pain to allow for ease with gripping work tools (Progressing)   Patient will report no more than 1/10 pain in right shoulder with upward reach into the cupboard. (Progressing)   Patient will demonstrate independent awareness of postural corrections to provide proximal stability for distal limb movements. (Progressing)      Plan:   Frequency / Duration: Patient will be seen for 2 x/week or a total of 12 visits over a 90 day period. Treatment will include: Manual Therapy, Neuromuscular Re-education, Self-Care Home Management, Therapeutic Activities, Therapeutic Exercise, Home Exercise Program instruction, Modalities to include: Ultrasound and hot packs,  and taping and custom splinting. Next session: charmaine scap exercises; postural re-ed; RC strengthening; check her theraband from home and finalize HEP for transition to HEP and self management.       HEP Date issued  Date amended Date discharged  Comments (HEP2Go code if used)   Wrist muscle stretches 7/5/23         Radial nerve glides 7/5/23         Pendulum ex 7/7/23         Middle, lower traps 7/7/23         Chicken wings 7/7/23         Radial nerve glides 7/7/23          Serratus ant: slides, ball on wall; ER sidelying, upper trap stretches 7/20/23            Charges: 1 US,  2 TE     Total Timed Treatment: 40 minutes    Total Treatment Time: 40 minutes  HUONG Tijerina, OTR/L, CHT

## 2023-08-01 ENCOUNTER — OFFICE VISIT (OUTPATIENT)
Dept: OCCUPATIONAL MEDICINE | Age: 78
End: 2023-08-01
Attending: ORTHOPAEDIC SURGERY
Payer: MEDICARE

## 2023-08-01 PROCEDURE — 97035 APP MDLTY 1+ULTRASOUND EA 15: CPT

## 2023-08-01 PROCEDURE — 97110 THERAPEUTIC EXERCISES: CPT

## 2023-08-03 NOTE — PROGRESS NOTES
[facility]  Pt has attended 8 visits in Occupational Therapy. Diagnosis:      right lateral epi, right shoulder impingement, RSF tenosynovitis   Referring Provider: Ja Grover  Date of Evaluation:    7/5/23    Precautions:  None  Next MD visit: 8/28/23  Date of Surgery: n/a   Insurance Primary/Secondary: Jonathan Ross / [employer]ne [employer]     # Auth Visits: n/a          Order Date:  6/19/2023  Authorizing Provider:   Penny Mcdowell     Procedure:  OP REFERRAL TO [address] [place] [address] [[id number]]         Order #:   [id number]  Qty:  1     Priority:  Routine                   Class:   IHP - RFL     Standing Interval:          Standing Occurrences:          Expires on:            Expected by:    Associated DX:  Lateral epicondylitis of right elbow (M77.11)     Order summary:  IHP - RFL, Routine, 8 visits  Occupational  Therapy Area of Concentration: Orthopedic  Occupational Therapy Ortho: UE  If the patient can be seen sooner with a PT vs an OT, can we cancel this order and replace with a PT order? Yes         Comments:  Right lateral epicondylitis. Eval and treat. Home exercise program.  Modalities as needed. Subjective: \"I'll tell you, there's been an improvement. \"    Pain:    0/10      Objective:    OUTCOME MEASURE   (Initial Eval):   QuickDASH Outcome Score  Score: 13.64 % (6/29/2023 10:21 PM)    Outcome Measure:  Discharge  Post QuickDASH Outcome Score  Post Score: 4.55 % (8/8/2023 10:19 AM)    9.09 % improvement    Hand Strength: (measured in pounds)    Date 6/30/23 Date 6/30/23 8/8/23   Position Right  Left  right    in standard position 43 48 47    in stress loaded position 31 45 41      Date  7/18/23 7/20/23 7/25/23 7/27/23 8/1/23 8/8/23   Visit #  3 4 5 6 7 8   # of visits authorized:          # of visits in OT POC:   12 12 12 12 12 12   Evaluation          Progress Report written          Manual Therapy          STM           cupping           IASTM          Ther ex           wrist extensor stretches          Wrist flexor stretches          Radial nerve glides           middle and lower traps strengthening  middle traps  Middle traps      Serratus ant strengthening  ball on wall Supine, 2 sets of 10, 1#; ball on wall, bilateral alternating on mat        External rotator strengthening  yellow band Sidelying 1#, 2 sets of 10 right Red tubing, 2 sets of 10 Red tubing, 2 sets of 15 Red tubing, 2 sets of 15 Personal red tubing    shoulder stabilizers  x x IR red tubing, 1 set of 10 IR red tubing, 2 sets of 10 IR red tubing, 2 sets of 10 Red tubing    pendulum ex  10x   x x x   Postural re-ed x x x x x x   Doorway stretches, lat stretches Doorway, bolster  bolster bolster bolster    Cervical general stretches  Right upper traps and [employer] reviewed  reviewed reviewed   Bulk putty   Yellow, roll,  and transfer, pinch and search for beads Yellow, roll,  and transfer, pinch and search for beads Yellow     Tubing     Red: shoulder pulls and punches: 1 set of 10 each of four directions Red: shoulder pulls and punches: 2 sets of 10 each of four directions Red    Closed chain ex for shoulder motions   Window washing  Window washing     HEP/  Patient education topics  use of heat, limit Upper traps activation; postural correction in mirror See below Anatomy of injury Anatomy of shoulder, arthritic changes US mechanics HEP with personal red tubing   Therapeutic Activity                                                                             Objective measurements taken and reviewed with patient       See above   Neuromuscular Re-education                                                      Orthotic fitting and training          Taping          Modalities  Ultrasound:  3mHz  0.8w/cm2  100%  to right shoulder/RC for 8 minutes. Ultrasound:  3mHz  0.8w/cm2  100%  to right shoulder/RC for 8 minutes. Ultrasound:  3mHz  0.8w/cm2  100%  to right shoulder/RC/supraspinatus for 8 minutes. Ultrasound:  3mHz  0.8w/cm2  100%  to right shoulder/RC/supraspinatus for 8 minutes. Ultrasound:  3mHz  0.8w/cm2  100%  to right shoulder/RC/supraspinatus for 8 minutes. Ultrasound:  3mHz  0.8w/cm2  100%  to right shoulder/RC/supraspinatus for 8 minutes. X= performed this date as previously outlined    Assessment:  Patient is a 71 yo female, who has been seen in Occupational Therapy since initial eval on 7/5/23, with last treatment session on 8/8/23. The patient has attended 8/10 scheduled appointments, with 0 no shows and 2 cancellations. Focus of skilled OT has been on pain, and strength, with use of interventions including therapeutic activities, therapeutic exercises, modalities such as US, and adapted ADL training to achieve the functional goals listed below. She has shown improvement in strength and pain, with functional improvements reported as in picking up coffee pot, lifting grocery bag, and vacuuming and daily occupation engagement. Patient rated Outcome measure of the Quick DASH indicates improvement/normal rating. Limitations and barriers toward progress include: shoulder arthritis. She has met the goals as noted below, reaching maximal benefit from skilled OT, and is ready for transition to home program at this time.         Goals: (to be met in 12 visits)  Patient will be independent and compliant with comprehensive HEP to maintain progress achieved in OT. (met)  Patient to verbalize and demonstrate understanding of aggravating and alleviating factors related to lateral epicondylitis to decrease risk of recurrence for improved functional use of right UE. (met)  Patient will present with increase in force tolerance of gripping with the right hand in the \"stress position\" to 40 # of force with no more than 1/10 pain to allow for ease with gripping work tools (met)   Patient will report no more than 1/10 pain in right shoulder with upward reach into the cupboard. (met)   Patient will demonstrate independent awareness of postural corrections to provide proximal stability for distal limb movements. (met)      Plan:   Discharge OT and continue with HEP and self management.       HEP Date issued  Date amended Date discharged  Comments (HEP2Go code if used)   Wrist muscle stretches 7/5/23         Radial nerve glides 7/5/23         Pendulum ex 7/7/23         Middle, lower traps 7/7/23         Chicken wings 7/7/23         Radial nerve glides 7/7/23          Serratus ant: slides, ball on wall; ER sidelying, upper trap stretches 7/20/23            Charges: 1 US,  2 TE     Total Timed Treatment: 45 minutes    Total Treatment Time: 45 minutes  HUONG Aguilar, OTR/L, CHT

## 2023-08-08 ENCOUNTER — OFFICE VISIT (OUTPATIENT)
Dept: OCCUPATIONAL MEDICINE | Age: 78
End: 2023-08-08
Attending: ORTHOPAEDIC SURGERY
Payer: MEDICARE

## 2023-08-08 PROCEDURE — 97110 THERAPEUTIC EXERCISES: CPT

## 2023-08-08 PROCEDURE — 97035 APP MDLTY 1+ULTRASOUND EA 15: CPT

## 2023-08-14 ENCOUNTER — OFFICE VISIT (OUTPATIENT)
Dept: ORTHOPEDICS CLINIC | Facility: CLINIC | Age: 78
End: 2023-08-14
Payer: MEDICARE

## 2023-08-14 VITALS — HEIGHT: 65 IN | BODY MASS INDEX: 32.99 KG/M2 | WEIGHT: 198 LBS

## 2023-08-14 DIAGNOSIS — M65.351 TRIGGER LITTLE FINGER OF RIGHT HAND: Primary | ICD-10-CM

## 2023-08-14 PROCEDURE — 99213 OFFICE O/P EST LOW 20 MIN: CPT | Performed by: ORTHOPAEDIC SURGERY

## 2023-10-12 DIAGNOSIS — I65.23 BILATERAL CAROTID ARTERY STENOSIS: ICD-10-CM

## 2023-10-12 DIAGNOSIS — Z51.81 ENCOUNTER FOR MONITORING STATIN THERAPY: ICD-10-CM

## 2023-10-12 DIAGNOSIS — Z79.899 ENCOUNTER FOR MONITORING STATIN THERAPY: ICD-10-CM

## 2023-10-12 RX ORDER — ROSUVASTATIN CALCIUM 10 MG/1
TABLET, COATED ORAL
Qty: 90 TABLET | Refills: 10 | Status: SHIPPED | OUTPATIENT
Start: 2023-10-12

## 2023-10-12 NOTE — TELEPHONE ENCOUNTER
Requested Prescriptions     Pending Prescriptions Disp Refills    ROSUVASTATIN 10 MG Oral Tab [Pharmacy Med Name: ROSUVASTATIN CALCIUM 10 MG Tablet] 90 tablet 10     Sig: TAKE 1 TABLET EVERY NIGHT     Last refill 4/26/23 #90  LOV 5/18/23  Future Appointments   Date Time Provider Donato Jamil   11/27/2023 10:30 AM Dressler, Virgin Lair, PA-C [id number]       Cholesterol Medication Protocol Khtdbi41/12/2023 10:36 AM   Protocol Details ALT < 80    ALT resulted within past year    Lipid panel within past 12 months    Appointment within past 12 or next 3 months

## 2023-11-17 ENCOUNTER — TELEPHONE (OUTPATIENT)
Dept: FAMILY MEDICINE CLINIC | Facility: CLINIC | Age: 78
End: 2023-11-17

## 2023-11-17 DIAGNOSIS — E78.5 HYPERLIPIDEMIA LDL GOAL <70: Primary | ICD-10-CM

## 2023-11-17 DIAGNOSIS — I10 BENIGN ESSENTIAL HYPERTENSION: ICD-10-CM

## 2023-11-17 DIAGNOSIS — Z12.31 ENCOUNTER FOR SCREENING MAMMOGRAM FOR MALIGNANT NEOPLASM OF BREAST: ICD-10-CM

## 2023-11-17 NOTE — TELEPHONE ENCOUNTER
Last Mammogram : 12/14/2022, to repeat in 12 months    Pended orders. Please sign if appropriate. Thank you.

## 2023-11-17 NOTE — TELEPHONE ENCOUNTER
Called and informed patient that orders were placed,. She voiced understanding and she has no question/concern at this time.

## 2023-11-21 ENCOUNTER — LAB ENCOUNTER (OUTPATIENT)
Dept: LAB | Age: 78
End: 2023-11-21
Attending: FAMILY MEDICINE
Payer: MEDICARE

## 2023-11-21 DIAGNOSIS — I10 BENIGN ESSENTIAL HYPERTENSION: ICD-10-CM

## 2023-11-21 DIAGNOSIS — E78.5 HYPERLIPIDEMIA LDL GOAL <70: ICD-10-CM

## 2023-11-21 LAB
ALBUMIN SERPL-MCNC: 3.6 G/DL (ref 3.4–5)
ALBUMIN/GLOB SERPL: 1 {RATIO} (ref 1–2)
ALP LIVER SERPL-CCNC: 49 U/L
ALT SERPL-CCNC: 28 U/L
ANION GAP SERPL CALC-SCNC: 9 MMOL/L (ref 0–18)
AST SERPL-CCNC: 22 U/L (ref 15–37)
BASOPHILS # BLD AUTO: 0.03 X10(3) UL (ref 0–0.2)
BASOPHILS NFR BLD AUTO: 0.5 %
BILIRUB SERPL-MCNC: 0.9 MG/DL (ref 0.1–2)
BILIRUB UR QL STRIP.AUTO: NEGATIVE
BUN BLD-MCNC: 20 MG/DL (ref 9–23)
CALCIUM BLD-MCNC: 9.5 MG/DL (ref 8.5–10.1)
CHLORIDE SERPL-SCNC: 103 MMOL/L (ref 98–112)
CHOLEST SERPL-MCNC: 139 MG/DL (ref ?–200)
CLARITY UR REFRACT.AUTO: CLEAR
CO2 SERPL-SCNC: 26 MMOL/L (ref 21–32)
COLOR UR AUTO: YELLOW
CREAT BLD-MCNC: 0.89 MG/DL
EGFRCR SERPLBLD CKD-EPI 2021: 67 ML/MIN/1.73M2 (ref 60–?)
EOSINOPHIL # BLD AUTO: 0.13 X10(3) UL (ref 0–0.7)
EOSINOPHIL NFR BLD AUTO: 2 %
ERYTHROCYTE [DISTWIDTH] IN BLOOD BY AUTOMATED COUNT: 12.6 %
FASTING PATIENT LIPID ANSWER: YES
FASTING STATUS PATIENT QL REPORTED: YES
GLOBULIN PLAS-MCNC: 3.5 G/DL (ref 2.8–4.4)
GLUCOSE BLD-MCNC: 84 MG/DL (ref 70–99)
GLUCOSE UR STRIP.AUTO-MCNC: NORMAL MG/DL
HCT VFR BLD AUTO: 42 %
HDLC SERPL-MCNC: 68 MG/DL (ref 40–59)
HGB BLD-MCNC: 14.1 G/DL
IMM GRANULOCYTES # BLD AUTO: 0.02 X10(3) UL (ref 0–1)
IMM GRANULOCYTES NFR BLD: 0.3 %
KETONES UR STRIP.AUTO-MCNC: NEGATIVE MG/DL
LDLC SERPL CALC-MCNC: 57 MG/DL (ref ?–100)
LEUKOCYTE ESTERASE UR QL STRIP.AUTO: NEGATIVE
LYMPHOCYTES # BLD AUTO: 1.52 X10(3) UL (ref 1–4)
LYMPHOCYTES NFR BLD AUTO: 23.5 %
MCH RBC QN AUTO: 29.8 PG (ref 26–34)
MCHC RBC AUTO-ENTMCNC: 33.6 G/DL (ref 31–37)
MCV RBC AUTO: 88.8 FL
MONOCYTES # BLD AUTO: 0.47 X10(3) UL (ref 0.1–1)
MONOCYTES NFR BLD AUTO: 7.3 %
NEUTROPHILS # BLD AUTO: 4.3 X10 (3) UL (ref 1.5–7.7)
NEUTROPHILS # BLD AUTO: 4.3 X10(3) UL (ref 1.5–7.7)
NEUTROPHILS NFR BLD AUTO: 66.4 %
NITRITE UR QL STRIP.AUTO: NEGATIVE
NONHDLC SERPL-MCNC: 71 MG/DL (ref ?–130)
OSMOLALITY SERPL CALC.SUM OF ELEC: 288 MOSM/KG (ref 275–295)
PH UR STRIP.AUTO: 7.5 [PH] (ref 5–8)
PLATELET # BLD AUTO: 256 10(3)UL (ref 150–450)
POTASSIUM SERPL-SCNC: 3.5 MMOL/L (ref 3.5–5.1)
PROT SERPL-MCNC: 7.1 G/DL (ref 6.4–8.2)
PROT UR STRIP.AUTO-MCNC: NEGATIVE MG/DL
RBC # BLD AUTO: 4.73 X10(6)UL
RBC UR QL AUTO: NEGATIVE
SODIUM SERPL-SCNC: 138 MMOL/L (ref 136–145)
SP GR UR STRIP.AUTO: 1.02 (ref 1–1.03)
TRIGL SERPL-MCNC: 70 MG/DL (ref 30–149)
TSI SER-ACNC: 3.04 MIU/ML (ref 0.36–3.74)
UROBILINOGEN UR STRIP.AUTO-MCNC: NORMAL MG/DL
VLDLC SERPL CALC-MCNC: 10 MG/DL (ref 0–30)
WBC # BLD AUTO: 6.5 X10(3) UL (ref 4–11)

## 2023-11-21 PROCEDURE — 80061 LIPID PANEL: CPT

## 2023-11-21 PROCEDURE — 85025 COMPLETE CBC W/AUTO DIFF WBC: CPT

## 2023-11-21 PROCEDURE — 80053 COMPREHEN METABOLIC PANEL: CPT

## 2023-11-21 PROCEDURE — 81003 URINALYSIS AUTO W/O SCOPE: CPT

## 2023-11-21 PROCEDURE — 84443 ASSAY THYROID STIM HORMONE: CPT

## 2023-12-05 ENCOUNTER — LAB ENCOUNTER (OUTPATIENT)
Dept: LAB | Age: 78
End: 2023-12-05
Attending: FAMILY MEDICINE
Payer: MEDICARE

## 2023-12-05 ENCOUNTER — OFFICE VISIT (OUTPATIENT)
Dept: FAMILY MEDICINE CLINIC | Facility: CLINIC | Age: 78
End: 2023-12-05
Payer: MEDICARE

## 2023-12-05 VITALS
BODY MASS INDEX: 33.99 KG/M2 | WEIGHT: 204 LBS | HEART RATE: 72 BPM | DIASTOLIC BLOOD PRESSURE: 76 MMHG | RESPIRATION RATE: 12 BRPM | SYSTOLIC BLOOD PRESSURE: 126 MMHG | HEIGHT: 65 IN

## 2023-12-05 DIAGNOSIS — M79.602 PAIN IN BOTH UPPER EXTREMITIES: ICD-10-CM

## 2023-12-05 DIAGNOSIS — M79.601 PAIN IN BOTH UPPER EXTREMITIES: ICD-10-CM

## 2023-12-05 DIAGNOSIS — E78.5 HYPERLIPIDEMIA LDL GOAL <70: ICD-10-CM

## 2023-12-05 DIAGNOSIS — F41.1 ANXIETY STATE: ICD-10-CM

## 2023-12-05 DIAGNOSIS — Z00.00 ENCOUNTER FOR ANNUAL HEALTH EXAMINATION: Primary | ICD-10-CM

## 2023-12-05 DIAGNOSIS — I10 BENIGN ESSENTIAL HYPERTENSION: ICD-10-CM

## 2023-12-05 LAB
CK SERPL-CCNC: 160 U/L
CRP SERPL-MCNC: <0.29 MG/DL (ref ?–0.3)
ERYTHROCYTE [SEDIMENTATION RATE] IN BLOOD: 29 MM/HR

## 2023-12-05 PROCEDURE — 99214 OFFICE O/P EST MOD 30 MIN: CPT | Performed by: FAMILY MEDICINE

## 2023-12-05 PROCEDURE — 86140 C-REACTIVE PROTEIN: CPT

## 2023-12-05 PROCEDURE — 82550 ASSAY OF CK (CPK): CPT

## 2023-12-05 PROCEDURE — G0439 PPPS, SUBSEQ VISIT: HCPCS | Performed by: FAMILY MEDICINE

## 2023-12-05 PROCEDURE — 85652 RBC SED RATE AUTOMATED: CPT

## 2023-12-05 RX ORDER — GARLIC EXTRACT 500 MG
1 CAPSULE ORAL DAILY
COMMUNITY

## 2023-12-16 ENCOUNTER — HOSPITAL ENCOUNTER (OUTPATIENT)
Dept: MAMMOGRAPHY | Age: 78
Discharge: HOME | End: 2023-12-16
Attending: FAMILY MEDICINE
Payer: MEDICARE

## 2023-12-16 DIAGNOSIS — Z12.31 ENCOUNTER FOR SCREENING MAMMOGRAM FOR MALIGNANT NEOPLASM OF BREAST: ICD-10-CM

## 2023-12-16 PROCEDURE — 77063 BREAST TOMOSYNTHESIS BI: CPT | Performed by: FAMILY MEDICINE

## 2023-12-16 PROCEDURE — 77067 SCR MAMMO BI INCL CAD: CPT | Performed by: FAMILY MEDICINE

## 2024-01-11 ENCOUNTER — OFFICE VISIT (OUTPATIENT)
Dept: ORTHOPEDICS CLINIC | Facility: CLINIC | Age: 79
End: 2024-01-11
Payer: MEDICARE

## 2024-01-11 VITALS — BODY MASS INDEX: 33.99 KG/M2 | WEIGHT: 204 LBS | HEIGHT: 65 IN

## 2024-01-11 DIAGNOSIS — M65.351 TRIGGER LITTLE FINGER OF RIGHT HAND: Primary | ICD-10-CM

## 2024-01-11 RX ORDER — BETAMETHASONE SODIUM PHOSPHATE AND BETAMETHASONE ACETATE 3; 3 MG/ML; MG/ML
6 INJECTION, SUSPENSION INTRA-ARTICULAR; INTRALESIONAL; INTRAMUSCULAR; SOFT TISSUE ONCE
Status: COMPLETED | OUTPATIENT
Start: 2024-01-11 | End: 2024-01-11

## 2024-01-11 RX ADMIN — BETAMETHASONE SODIUM PHOSPHATE AND BETAMETHASONE ACETATE 6 MG: 3; 3 INJECTION, SUSPENSION INTRA-ARTICULAR; INTRALESIONAL; INTRAMUSCULAR; SOFT TISSUE at 10:12:00

## 2024-01-11 NOTE — PROGRESS NOTES
Clinic Note [facility]     Assessment/Plan:  78 year old with     Triggering of right small finger resolved status post 1 corticosteroid injection with recurrence, will repeat injection today.  If her triggering returns we will discuss proceeding with an A1 pulley release.  Right thumb CMC osteoarthritis-mildly symptomatic.  Recommend observation at this time.  Okay to use naproxen for short period for pain relief.  2.    R cyst between middle and ringer between webspace cyst-patient has previously had it removed but has recurred and is asymptomatic from it other than some intermittent discomfort thus we will observe  3.    Volar ganglion cyst of right wrist-asymptomatic recommend observation  4. R wrist pain - signficantly improved at this point. Possibly pseudogout flare up. So radiographic signs of chondrocalcinosis are noted.      Injection:    Written consent was obtained.  The skin was prepped with alcohol.  Ethyl chloride spray was used anesthetize the superficial skin.  A 25-gauge needle was used to inject 1.5 mL mixture of 1 mL of 6 mg of betamethasone and 1 mL of 1% lidocaine into rigth  small A1 pulley.  Hemostasis achieved.  Band-Aid was applied.  Patient tolerated procedure without complication.      Physical Exam:    Ht 5' 5\" (1.651 m)   Wt 204 lb (92.5 kg)   LMP 09/25/1998   BMI 33.95 kg/m²     Constitutional: NAD. AOx3. Well-developed and Well-nourished.   Psychiatric: Normal mood/ affect/ behavior. Judgment and thought content normal.     Right upper Extremity:   Inspection: Skin Intact. No skin lesions. No gross deformity.  She does have a volar carpal ganglion cyst and a cyst between the middle and ring finger webspace   Palpation:  (-) TTP over A1 pulley. No focal areas wrist TTP   Motion: Elbow: normal bilateral symmetric ext/flex  Wrist: normal bilateral symmetric ext/flex/sup/pro  Finger: full composite fist   Special Tests: (-) Active triggering. (-) PIPJ contracture. Normally  sensate digit. Normally perfused digit.       CC: Triggering of right small finger    HPI: 78 year old  female presents with triggering of right small finger. It started several months ago. It has failed to improve/resolve. Pain level is moderate. Pain is described as locking. Patient has tried nothing yet.      Interval history: Patient reports complete improvement of symptoms in her finger. Do not need to repeat steroid injection today. She reports that her elbow is feeling better and she finished physical therapy.     (-) pain at A1 Pulley  (-) active triggering    Past Medical History:   Diagnosis Date    Chronic phlebitis of superficial vein of left lower extremity 12/4/2017    History of colonoscopy 04/02    x2    Iron deficiency anemia, unspecified     Left leg swelling 12/4/2017    Osteoarthritis     Right leg swelling 12/4/2017     Past Surgical History:   Procedure Laterality Date    COLONOSCOPY  1/11/13    Repeat 10 years    HAND/FINGER SURGERY UNLISTED  1975    right    JONATHAN BIOPSY STEREO NODULE 1 SITE RIGHT (CPT=19081)  2008    benign     Current Outpatient Medications   Medication Sig Dispense Refill    acidophilus-pectin Oral Cap Take 1 capsule by mouth daily.      ROSUVASTATIN 10 MG Oral Tab TAKE 1 TABLET EVERY NIGHT 90 tablet 10    HYDROCHLOROTHIAZIDE 25 MG Oral Tab TAKE 1 TABLET EVERY DAY 90 tablet 1    BISOPROLOL 5 MG Oral Tab TAKE 1 TABLET EVERY DAY 90 tablet 1    ESCITALOPRAM 10 MG Oral Tab TAKE 1 TABLET EVERY DAY 90 tablet 1    vitamin E 400 UNITS Oral Cap Take 1,000 Units by mouth daily.      Cholecalciferol 125 MCG (5000 UT) Oral Tab Take 1 tablet (5,000 Units total) by mouth daily. Takes every other day      Misc Natural Products (TURMERIC CURCUMIN) Oral Cap       Coenzyme Q10 (CO Q 10 OR) Take by mouth.      Vitamin B-12 250 MCG Oral Tab Take 1 tablet (250 mcg total) by mouth daily.      Multiple Vitamin (DAILY MULTIVITAMIN OR) Take  by mouth.      furosemide 20 MG Oral Tab Take 1 tablet (20  mg total) by mouth daily as needed. Take only as needed for swelling. (Patient not taking: Reported on 12/5/2023) 30 tablet 1     Allergies   Allergen Reactions    Zocor [Simvastatin - High Dose]      TABS; aching     Family History   Problem Relation Age of Onset    Other (Other[other]) Father 50        Alcohol related    Diabetes Mother     Other (Other[other]) Mother     Ovarian Cancer Maternal Aunt      Social History     Occupational History    Not on file   Tobacco Use    Smoking status: Never    Smokeless tobacco: Never   Vaping Use    Vaping Use: Never used   Substance and Sexual Activity    Alcohol use: Yes     Alcohol/week: 0.0 standard drinks of alcohol     Comment: 1 week-occasional glass of wine    Drug use: No    Sexual activity: Not on file        Review of Systems (negative unless bolded):  General: fevers, chills, fatigue  CV:  chest pain, palpitations, leg swelling  Msk: bodyaches, neck pain, neck stiffness  Skin: rashes, open wounds, nonhealing ulcers  Hem: bleeds easily, bruise easily, immunocompromised  Neuro: dizziness, light headedness, headaches  Psych: anxious, depressed, anger issues      Julian Montano MD  Hand, Wrist, & Elbow Surgery  [facility]  [address] [place] [address], [address] [place] [address].org  [email or web address]  t: [phone]  f: [phone]

## 2024-03-07 DIAGNOSIS — F43.0 ACUTE STRESS REACTION: ICD-10-CM

## 2024-03-07 DIAGNOSIS — I10 BENIGN ESSENTIAL HYPERTENSION: ICD-10-CM

## 2024-03-07 DIAGNOSIS — F41.1 ANXIETY STATE: ICD-10-CM

## 2024-03-07 RX ORDER — BISOPROLOL FUMARATE 5 MG/1
TABLET, FILM COATED ORAL
Qty: 90 TABLET | Refills: 0 | Status: SHIPPED | OUTPATIENT
Start: 2024-03-07

## 2024-03-07 RX ORDER — HYDROCHLOROTHIAZIDE 25 MG/1
TABLET ORAL
Qty: 90 TABLET | Refills: 0 | Status: SHIPPED | OUTPATIENT
Start: 2024-03-07

## 2024-03-07 RX ORDER — ESCITALOPRAM OXALATE 10 MG/1
TABLET ORAL
Qty: 90 TABLET | Refills: 0 | Status: SHIPPED | OUTPATIENT
Start: 2024-03-07

## 2024-03-07 NOTE — TELEPHONE ENCOUNTER
Requested Prescriptions     Pending Prescriptions Disp Refills    ESCITALOPRAM 10 MG Oral Tab [Pharmacy Med Name: ESCITALOPRAM OXALATE 10 MG Tablet] 90 tablet 3     Sig: TAKE 1 TABLET EVERY DAY    HYDROCHLOROTHIAZIDE 25 MG Oral Tab [Pharmacy Med Name: HYDROCHLOROTHIAZIDE 25 MG Tablet] 90 tablet 3     Sig: TAKE 1 TABLET EVERY DAY    BISOPROLOL 5 MG Oral Tab [Pharmacy Med Name: BISOPROLOL FUMARATE 5 MG Tablet] 90 tablet 3     Sig: TAKE 1 TABLET EVERY DAY     Last refill 6/5/23 #90 x 1   LOV 12/5/23  No future appointments.    Next office visit 6/5/24  Refilled per protocol hydrochlorothiazide, bisoprolol

## 2024-05-28 ENCOUNTER — PATIENT MESSAGE (OUTPATIENT)
Dept: FAMILY MEDICINE CLINIC | Facility: CLINIC | Age: 79
End: 2024-05-28

## 2024-05-28 NOTE — TELEPHONE ENCOUNTER
Schedule follow up. Will review chart and determine any need for additional blood work at that time.

## 2024-05-28 NOTE — TELEPHONE ENCOUNTER
Patient is due for a 6 month follow up in June.  Would you like blood work completed at this time?

## 2024-05-28 NOTE — TELEPHONE ENCOUNTER
From: Adelina Thomas  To: Stephanie Dressler  Sent: 5/28/2024 11:34 AM CDT  Subject: Follow up Visit    oTrey Maynard!  I was wondering if I still needed to come in for a six month blood test and meds check/renewal. I was in to see you for my annual Medicare check up in December, 2023, but I don't remember whether or not you wanted me to come in for blood work again at six months. I'm doing well and have no problems, but I just wanted to be sure I'm keeping up with my check ups. I hope you're having a great summer so far! Thanks, Aleyda.

## 2024-06-26 DIAGNOSIS — I10 BENIGN ESSENTIAL HYPERTENSION: ICD-10-CM

## 2024-06-26 DIAGNOSIS — F43.0 ACUTE STRESS REACTION: ICD-10-CM

## 2024-06-26 DIAGNOSIS — F41.1 ANXIETY STATE: ICD-10-CM

## 2024-06-27 RX ORDER — HYDROCHLOROTHIAZIDE 25 MG/1
TABLET ORAL
Qty: 90 TABLET | Refills: 0 | Status: SHIPPED | OUTPATIENT
Start: 2024-06-27

## 2024-06-27 RX ORDER — BISOPROLOL FUMARATE 5 MG/1
TABLET, FILM COATED ORAL
Qty: 90 TABLET | Refills: 0 | Status: SHIPPED | OUTPATIENT
Start: 2024-06-27

## 2024-06-27 RX ORDER — ESCITALOPRAM OXALATE 10 MG/1
TABLET ORAL
Qty: 90 TABLET | Refills: 0 | Status: SHIPPED | OUTPATIENT
Start: 2024-06-27

## 2024-06-27 NOTE — TELEPHONE ENCOUNTER
Last office visit: 12/5/2024  Last Refill: 3/7/2024  Return To Clinic: 6 months per last office visit  Protocol: Please refill if appropriate,has scheduled appointment on 7/9/2024               Medication Quantity Refills Start End   escitalopram 10 MG Oral Tab 90 tablet 0 3/7/2024 --   Sig:   TAKE 1 TABLET EVERY DAY     Route:   (none)

## 2024-07-09 ENCOUNTER — OFFICE VISIT (OUTPATIENT)
Dept: FAMILY MEDICINE CLINIC | Facility: CLINIC | Age: 79
End: 2024-07-09
Payer: MEDICARE

## 2024-07-09 ENCOUNTER — LAB ENCOUNTER (OUTPATIENT)
Dept: LAB | Age: 79
End: 2024-07-09
Attending: FAMILY MEDICINE
Payer: MEDICARE

## 2024-07-09 VITALS
SYSTOLIC BLOOD PRESSURE: 122 MMHG | DIASTOLIC BLOOD PRESSURE: 76 MMHG | HEIGHT: 65 IN | TEMPERATURE: 98 F | HEART RATE: 61 BPM | RESPIRATION RATE: 20 BRPM | BODY MASS INDEX: 34.22 KG/M2 | WEIGHT: 205.38 LBS

## 2024-07-09 DIAGNOSIS — I65.23 BILATERAL CAROTID ARTERY STENOSIS: ICD-10-CM

## 2024-07-09 DIAGNOSIS — E78.5 HYPERLIPIDEMIA LDL GOAL <70: ICD-10-CM

## 2024-07-09 DIAGNOSIS — I10 BENIGN ESSENTIAL HYPERTENSION: Primary | ICD-10-CM

## 2024-07-09 DIAGNOSIS — R53.83 FATIGUE, UNSPECIFIED TYPE: ICD-10-CM

## 2024-07-09 DIAGNOSIS — I10 BENIGN ESSENTIAL HYPERTENSION: ICD-10-CM

## 2024-07-09 DIAGNOSIS — R63.5 WEIGHT GAIN: ICD-10-CM

## 2024-07-09 DIAGNOSIS — F41.1 ANXIETY STATE: ICD-10-CM

## 2024-07-09 DIAGNOSIS — E55.9 VITAMIN D DEFICIENCY: ICD-10-CM

## 2024-07-09 DIAGNOSIS — I65.29 CAROTID ATHEROSCLEROSIS, UNSPECIFIED LATERALITY: ICD-10-CM

## 2024-07-09 LAB
ALBUMIN SERPL-MCNC: 3.9 G/DL (ref 3.4–5)
ALBUMIN/GLOB SERPL: 1 {RATIO} (ref 1–2)
ALP LIVER SERPL-CCNC: 58 U/L
ALT SERPL-CCNC: 32 U/L
ANION GAP SERPL CALC-SCNC: 4 MMOL/L (ref 0–18)
AST SERPL-CCNC: 20 U/L (ref 15–37)
BASOPHILS # BLD AUTO: 0.03 X10(3) UL (ref 0–0.2)
BASOPHILS NFR BLD AUTO: 0.4 %
BILIRUB SERPL-MCNC: 1 MG/DL (ref 0.1–2)
BUN BLD-MCNC: 21 MG/DL (ref 9–23)
CALCIUM BLD-MCNC: 9.9 MG/DL (ref 8.5–10.1)
CHLORIDE SERPL-SCNC: 102 MMOL/L (ref 98–112)
CHOLEST SERPL-MCNC: 151 MG/DL (ref ?–200)
CO2 SERPL-SCNC: 31 MMOL/L (ref 21–32)
CREAT BLD-MCNC: 0.93 MG/DL
EGFRCR SERPLBLD CKD-EPI 2021: 63 ML/MIN/1.73M2 (ref 60–?)
EOSINOPHIL # BLD AUTO: 0.16 X10(3) UL (ref 0–0.7)
EOSINOPHIL NFR BLD AUTO: 2.4 %
ERYTHROCYTE [DISTWIDTH] IN BLOOD BY AUTOMATED COUNT: 12.9 %
FASTING PATIENT LIPID ANSWER: YES
FASTING STATUS PATIENT QL REPORTED: YES
GLOBULIN PLAS-MCNC: 3.8 G/DL (ref 2.8–4.4)
GLUCOSE BLD-MCNC: 93 MG/DL (ref 70–99)
HCT VFR BLD AUTO: 40.3 %
HDLC SERPL-MCNC: 74 MG/DL (ref 40–59)
HGB BLD-MCNC: 14 G/DL
IMM GRANULOCYTES # BLD AUTO: 0.02 X10(3) UL (ref 0–1)
IMM GRANULOCYTES NFR BLD: 0.3 %
LDLC SERPL CALC-MCNC: 61 MG/DL (ref ?–100)
LYMPHOCYTES # BLD AUTO: 1.35 X10(3) UL (ref 1–4)
LYMPHOCYTES NFR BLD AUTO: 20 %
MCH RBC QN AUTO: 31 PG (ref 26–34)
MCHC RBC AUTO-ENTMCNC: 34.7 G/DL (ref 31–37)
MCV RBC AUTO: 89.4 FL
MONOCYTES # BLD AUTO: 0.45 X10(3) UL (ref 0.1–1)
MONOCYTES NFR BLD AUTO: 6.7 %
NEUTROPHILS # BLD AUTO: 4.73 X10 (3) UL (ref 1.5–7.7)
NEUTROPHILS # BLD AUTO: 4.73 X10(3) UL (ref 1.5–7.7)
NEUTROPHILS NFR BLD AUTO: 70.2 %
NONHDLC SERPL-MCNC: 77 MG/DL (ref ?–130)
OSMOLALITY SERPL CALC.SUM OF ELEC: 287 MOSM/KG (ref 275–295)
PLATELET # BLD AUTO: 263 10(3)UL (ref 150–450)
POTASSIUM SERPL-SCNC: 3.9 MMOL/L (ref 3.5–5.1)
PROT SERPL-MCNC: 7.7 G/DL (ref 6.4–8.2)
RBC # BLD AUTO: 4.51 X10(6)UL
SODIUM SERPL-SCNC: 137 MMOL/L (ref 136–145)
T4 FREE SERPL-MCNC: 1 NG/DL (ref 0.8–1.7)
TRIGL SERPL-MCNC: 88 MG/DL (ref 30–149)
TSI SER-ACNC: 3.77 MIU/ML (ref 0.36–3.74)
VIT B12 SERPL-MCNC: 1021 PG/ML (ref 193–986)
VIT D+METAB SERPL-MCNC: 45.5 NG/ML (ref 30–100)
VLDLC SERPL CALC-MCNC: 13 MG/DL (ref 0–30)
WBC # BLD AUTO: 6.7 X10(3) UL (ref 4–11)

## 2024-07-09 PROCEDURE — 84439 ASSAY OF FREE THYROXINE: CPT

## 2024-07-09 PROCEDURE — 80061 LIPID PANEL: CPT

## 2024-07-09 PROCEDURE — 82306 VITAMIN D 25 HYDROXY: CPT

## 2024-07-09 PROCEDURE — 99214 OFFICE O/P EST MOD 30 MIN: CPT | Performed by: FAMILY MEDICINE

## 2024-07-09 PROCEDURE — 84443 ASSAY THYROID STIM HORMONE: CPT

## 2024-07-09 PROCEDURE — 80053 COMPREHEN METABOLIC PANEL: CPT

## 2024-07-09 PROCEDURE — 85025 COMPLETE CBC W/AUTO DIFF WBC: CPT

## 2024-07-09 PROCEDURE — 82607 VITAMIN B-12: CPT

## 2024-07-09 PROCEDURE — G2211 COMPLEX E/M VISIT ADD ON: HCPCS | Performed by: FAMILY MEDICINE

## 2024-07-09 NOTE — PROGRESS NOTES
Adelina Thomas is a 79 year old female.  CHIEF COMPLAINT   Follow up on HTN, cholesterol  HPI:   No exertional chest pain or dyspnea. Compliant with medications.  Taking rosuvastatin    Has gained weight.  Not as motivated as she used to be.  More worry than before.  Taking lexapro 10 mg daily. Grandson with Crohns dx recently.  Granddaughter with EDS/POTS.  Worries a lot about her grand children. Brother passed away in February.  Not exercising much.    No HI/SI.   Fatigue.       PROCEDURE:  US CAROTID DOPPLER BILAT - DIAG IMG (CPT=93880)       LOCATION:  [facility]         COMPARISON:  None.       INDICATIONS:  I65.29 Carotid artery calcification, unspecified laterality R93.89 Abnormal x-ray of neck       TECHNIQUE:  Real-time gray-scale and duplex ultrasound was used to evaluate the bilateral extracranial carotid and vertebral arteries.  B-mode 2-dimensional images of the vascular structures, Doppler spectral analysis, and color flow Doppler imaging were    performed.  Stenosis measurements obtained in this exam were performed using Consensus Panel categories and NASCET criteria       PATIENT STATED HISTORY: (As transcribed by Technologist)  Baseline screening.            FINDINGS:         IMAGE FINDINGS:  Greater than 2 mm atherosclerotic plaque.       HEART RATE:      Regular.       VELOCITY MEASUREMENTS:                                      Right CCA Peak Systolic Velocity:  60.84 cm/s               Right Proximal ICA Peak Systolic Velocity:  50.13 cm/s               Right Mid ICA Peak Systolic Velocity:  61.79 cm/s               Right Distal ICA Peak Systolic Velocity:  65.07 cm/s               Right ICA/CCA Velocity Ratio:  1.07                                Left CCA Peak Systolic Velocity:  85.44 cm/s               Left Proximal ICA Peak Systolic Velocity:  44.27 cm/s               Left Mid ICA Peak Systolic Velocity:  67.33 cm/s               Left Distal ICA Peak Systolic Velocity:  81.95 cm/s                Left ICA/CCA Velocity Ratio:  0.96       The vertebral arteries demonstrate antegrade flow.                        Impression   CONCLUSION:  16-49% stenosis bilaterally.                   Dictated by (CST): Donato Piña MD on 8/02/2022 at 4:06 PM       Finalized by (CST): Donato Piña MD on 8/02/2022 at 4:07 PM        Taking rosuvastatin daily.   Mother had history of carotid endarterectomy.      Current Outpatient Medications   Medication Sig Dispense Refill    bisoprolol 5 MG Oral Tab TAKE 1 TABLET EVERY DAY 90 tablet 0    ESCITALOPRAM 10 MG Oral Tab TAKE 1 TABLET EVERY DAY 90 tablet 0    hydroCHLOROthiazide 25 MG Oral Tab TAKE 1 TABLET EVERY DAY 90 tablet 0    ROSUVASTATIN 10 MG Oral Tab TAKE 1 TABLET EVERY NIGHT 90 tablet 10    vitamin E 400 UNITS Oral Cap Take 1,000 Units by mouth daily.      Misc Natural Products (TURMERIC CURCUMIN) Oral Cap       Coenzyme Q10 (CO Q 10 OR) Take by mouth.      Vitamin B-12 250 MCG Oral Tab Take 1 tablet (250 mcg total) by mouth daily.      Multiple Vitamin (DAILY MULTIVITAMIN OR) Take  by mouth.      furosemide 20 MG Oral Tab Take 1 tablet (20 mg total) by mouth daily as needed. Take only as needed for swelling. (Patient not taking: Reported on 12/5/2023) 30 tablet 1    Cholecalciferol 125 MCG (5000 UT) Oral Tab Take 1 tablet (5,000 Units total) by mouth daily. 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    History of colonoscopy    x2    Iron deficiency anemia, unspecified    Left leg swelling    Osteoarthritis    Right leg swelling      Social History:  Social History     Socioeconomic History    Marital status:    Tobacco Use    Smoking status: Never    Smokeless tobacco: Never   Vaping Use    Vaping status: Never Used   Substance and Sexual Activity    Alcohol use: Yes     Alcohol/week: 0.0 standard drinks of alcohol     Comment: 1  week-occasional glass of wine    Drug use: No   Other Topics Concern    Caffeine Concern Yes     Comment: 2-3 cups coffee daily    Exercise Yes     Comment: on/off threadmill        REVIEW OF SYSTEMS:   GENERAL HEALTH: feels well otherwise    EXAM:   /76   Pulse 61   Temp 98.1 °F (36.7 °C) (Temporal)   Resp 20   Ht 5' 5\" (1.651 m)   Wt 205 lb 6.4 oz (93.2 kg)   LMP 09/25/1998   BMI 34.18 kg/m²   GENERAL: well developed, well nourished,in no apparent distress  SKIN: no rashes,no suspicious lesions  NECK: no bruit  LUNGS: clear to auscultation  CARDIO: RRR without murmur  EXT: no edema  ASSESSMENT AND PLAN:     Encounter Diagnoses   Name Primary?    Carotid atherosclerosis, unspecified laterality     Weight gain     Vitamin D deficiency     Fatigue, unspecified type     Hyperlipidemia LDL goal <70     Benign essential hypertension Yes    Bilateral carotid artery stenosis     Anxiety state        Orders Placed This Encounter   Procedures    Lipid Panel [E]    CBC W Differential W Platelet [E]    Comp Metabolic Panel (14)    TSH and Free T4 [E]    Vitamin B12 [E]    Vitamin D [E]       Meds & Refills for this Visit:  Requested Prescriptions      No prescriptions requested or ordered in this encounter       Imaging & Consults:  US CAROTID DOPPLER BILAT - DIAG IM (CPT=93880)      Patient Instructions   See Jessika Dietrich (nurse practitioner) or Dr. Shannon Salinas in 6 months.     Check fasting blood work.    Consider trial of counseling.    If blood work is normal, then consider increasing the lexapro to 15 mg daily.     Check carotid doppler.

## 2024-07-09 NOTE — PATIENT INSTRUCTIONS
See Jessika Dietrich (nurse practitioner) or Dr. Shannon Salinas in 6 months.     Check fasting blood work.    Consider trial of counseling.    If blood work is normal, then consider increasing the lexapro to 15 mg daily.     Check carotid doppler.

## 2024-07-11 DIAGNOSIS — R53.83 OTHER FATIGUE: ICD-10-CM

## 2024-07-11 DIAGNOSIS — R79.89 ELEVATED TSH: Primary | ICD-10-CM

## 2024-07-22 ENCOUNTER — TELEPHONE (OUTPATIENT)
Dept: ORTHOPEDICS CLINIC | Facility: CLINIC | Age: 79
End: 2024-07-22

## 2024-07-22 NOTE — TELEPHONE ENCOUNTER
Can we please call this patient and schedule with the appropriate provider? I called to cancel her, she has seen Jo in the past and would like to stick with her.     Patient aware that she cannot have monovisc inj at next visit because it needs to be approved, stated she just wanted to have her look and reevaluate

## 2024-07-23 NOTE — TELEPHONE ENCOUNTER
LMOM asking patient to contact our office to schedule an appointment with Jo Brown for bilateral knee pain.

## 2024-07-24 NOTE — TELEPHONE ENCOUNTER
LMOM for patient to call our office back to confirm what type of injection she is wanting to get.

## 2024-07-31 ENCOUNTER — HOSPITAL ENCOUNTER (OUTPATIENT)
Dept: ULTRASOUND IMAGING | Age: 79
Discharge: HOME | End: 2024-07-31
Attending: FAMILY MEDICINE
Payer: MEDICARE

## 2024-07-31 DIAGNOSIS — I65.29 CAROTID ATHEROSCLEROSIS, UNSPECIFIED LATERALITY: ICD-10-CM

## 2024-07-31 PROCEDURE — 93880 EXTRACRANIAL BILAT STUDY: CPT | Performed by: FAMILY MEDICINE

## 2024-08-01 ENCOUNTER — OFFICE VISIT (OUTPATIENT)
Dept: ORTHOPEDICS CLINIC | Facility: CLINIC | Age: 79
End: 2024-08-01
Payer: MEDICARE

## 2024-08-01 ENCOUNTER — TELEPHONE (OUTPATIENT)
Dept: ORTHOPEDICS CLINIC | Facility: CLINIC | Age: 79
End: 2024-08-01

## 2024-08-01 VITALS — BODY MASS INDEX: 34.16 KG/M2 | WEIGHT: 205 LBS | HEIGHT: 65 IN

## 2024-08-01 DIAGNOSIS — M17.0 PRIMARY OSTEOARTHRITIS OF BOTH KNEES: Primary | ICD-10-CM

## 2024-08-01 PROCEDURE — 99214 OFFICE O/P EST MOD 30 MIN: CPT | Performed by: PHYSICIAN ASSISTANT

## 2024-08-01 RX ORDER — MELOXICAM 15 MG/1
15 TABLET ORAL DAILY
Qty: 30 TABLET | Refills: 2 | Status: SHIPPED | OUTPATIENT
Start: 2024-08-01

## 2024-08-01 NOTE — PROGRESS NOTES
[facility] Progress Note      Chief Complaint:  Bilateral knee pain      Subjective: Adelina Thomas is a 79 year old female who is here for follow-up of her bilateral knee pain.  She has a previous diagnosis of degenerative arthritis and received Monovisc injections approximately 1 year ago to both knees.  She states that this provided relief and pain has recurred over the past month.  The right knee is worse than the left knee.  She does note achiness especially going up and down stairs.  She denies swelling or instability.  She has occasionally taken naproxen for pain relief.      Objective: Exam of bilateral knees and lower extremities reveals that she has minimal patellofemoral crepitus with range of motion.  She lacks 2 to 3 degrees of full extension on both knees.  She has no pain with flexion.  She is tender about the medial and lateral joint line of both knees.  Sensation is present to light touch.  Stable ligamentous exam.      Assessment: Bilateral knee degenerative joint disease      Plan: It would be reasonable to go ahead with a Monovisc authorization as this did provide significant benefit over the past year.  She would like to proceed.  She will follow-up in 3 to 4 weeks once the medication is delivered and authorized.  I will also send her prescription for meloxicam to take sparingly as needed for pain.  We discussed the option for cortisone if pain is not significantly relieved by the viscosupplementation injection.  She will follow-up as mentioned or sooner with questions, concerns, or worsening symptoms.        Jo Brown PA-C  Orthopedic Surgery   [address] [place] [id number]   t: [phone]  f: [phone]

## 2024-08-05 NOTE — TELEPHONE ENCOUNTER
Patient authorized visco to be scheduled:    DOS:08/15/24  PROVIDER: estuardo  MEDICATION: bilateral monovisc   OFFICE LOCATION: [facility]  PULLED: yes  LABELED: yes  PLACED: pod 1 med cabinet

## 2024-08-15 ENCOUNTER — OFFICE VISIT (OUTPATIENT)
Dept: ORTHOPEDICS CLINIC | Facility: CLINIC | Age: 79
End: 2024-08-15
Payer: MEDICARE

## 2024-08-15 VITALS — BODY MASS INDEX: 34.16 KG/M2 | HEIGHT: 65 IN | WEIGHT: 205 LBS

## 2024-08-15 DIAGNOSIS — M17.0 PRIMARY OSTEOARTHRITIS OF BOTH KNEES: Primary | ICD-10-CM

## 2024-08-15 PROCEDURE — 20610 DRAIN/INJ JOINT/BURSA W/O US: CPT | Performed by: PHYSICIAN ASSISTANT

## 2024-08-15 NOTE — PROGRESS NOTES
[facility] Progress Note      Chief Complaint:  Bilateral knee pain      Subjective: Adelina Thomas is a 79 year old female who is here for follow-up of her bilateral knee pain.  She is here for Monovisc injections to both knees today.  She received these a year ago and had good relief.  Injections have now been authorized through her insurance and delivered for administration.  She notes taking meloxicam significantly helps her pain as well.      Objective: Exam of bilateral knees and lower extremities reveals that the overlying skin is intact.  No palpable effusion.  Sensation is present to light touch.        Assessment: Bilateral knee degenerative joint disease      Plan: It would be reasonable to go ahead with bilateral knee Monovisc injections today.  She would like to proceed.  She will follow-up with us as needed with recurrent symptoms questions or concerns.    Risk, benefits, and alternatives to the Monovisc injection was discussed including but not limited to risk of needle infection, flareup, and failure to improve.  They would like to proceed and under meticulous sterile technique, 4 cc of Xylocaine was used anesthetize the lateral patellofemoral joint of the bilateral knee.  Then through an 18-gauge needle, the entire contents of the Monovisc injection was administered with free flow fluid.  They tolerated the injection well and a Band-Aid was applied.  They will follow-up with any adverse reactions.          Jo Brown PA-C  Orthopedic Surgery   [address] [place] [id number]   t: [phone]  f: [phone]

## 2024-09-18 DIAGNOSIS — I10 BENIGN ESSENTIAL HYPERTENSION: ICD-10-CM

## 2024-09-18 RX ORDER — HYDROCHLOROTHIAZIDE 25 MG/1
25 TABLET ORAL DAILY
Qty: 90 TABLET | Refills: 0 | Status: SHIPPED | OUTPATIENT
Start: 2024-09-18

## 2024-09-18 RX ORDER — BISOPROLOL FUMARATE 5 MG/1
TABLET, FILM COATED ORAL
Qty: 90 TABLET | Refills: 0 | Status: SHIPPED | OUTPATIENT
Start: 2024-09-18

## 2024-09-18 NOTE — TELEPHONE ENCOUNTER
Bisoprolol 5 MG     Please see pended medications.    Please sign if appropriate.      Thank you      Last OV: 07/09/2024      Last refill: 06/27/2024 for 90 tabs

## 2024-09-23 ENCOUNTER — TELEPHONE (OUTPATIENT)
Dept: ORTHOPEDICS CLINIC | Facility: CLINIC | Age: 79
End: 2024-09-23

## 2024-09-23 DIAGNOSIS — M17.0 PRIMARY OSTEOARTHRITIS OF BOTH KNEES: ICD-10-CM

## 2024-09-23 RX ORDER — MELOXICAM 15 MG/1
15 TABLET ORAL DAILY
Qty: 30 TABLET | Refills: 0 | Status: SHIPPED | OUTPATIENT
Start: 2024-09-23

## 2024-09-23 NOTE — TELEPHONE ENCOUNTER
Meloxicam 15 mg  DOS: N/A  Last OV: 08/15/24  Last refill date: 08/01/24     #/refills: 30/2  Upcoming appt: No future appointments.    07/09/24    BUN  9 - 23 mg/dL 21   Creatinine  0.55 - 1.02 mg/dL 0.93     eGFR-Cr  >=60 mL/min/1.73m2 63     Called [facility] and verified patient has not picked up her last refill.  Refill cancelled and medication sent to the [facility] per request.     [facility] called requesting a prescription refill for Meloxicam.  She should still have a refill available.

## 2024-09-26 DIAGNOSIS — F43.0 ACUTE STRESS REACTION: ICD-10-CM

## 2024-09-26 DIAGNOSIS — F41.1 ANXIETY STATE: ICD-10-CM

## 2024-09-26 NOTE — TELEPHONE ENCOUNTER
Did not pass protocol  Last office visit 7/9/2024  Last refill was: , _6/27/2024  90 _tabs  Next appointment: none scheduled    Please sign pended medication if appropriate     Medication Quantity Refills Start End   ESCITALOPRAM 10 MG Oral Tab 90 tablet 0 6/27/2024 --   Sig:   TAKE 1 TABLET EVERY DAY     Route:   (none)         Please call patient to schedule medication follow up then route to Dr. Salinas

## 2024-09-30 RX ORDER — ESCITALOPRAM OXALATE 10 MG/1
10 TABLET ORAL DAILY
Qty: 90 TABLET | Refills: 0 | OUTPATIENT
Start: 2024-09-30

## 2024-09-30 NOTE — TELEPHONE ENCOUNTER
S/w pt, scheduled MAW for 12/2/24. Pt does not need refill at this time, states she should have enough to last until appointment.

## 2024-10-22 DIAGNOSIS — M17.0 PRIMARY OSTEOARTHRITIS OF BOTH KNEES: ICD-10-CM

## 2024-10-22 RX ORDER — MELOXICAM 15 MG/1
15 TABLET ORAL DAILY
Qty: 30 TABLET | Refills: 11 | Status: SHIPPED | OUTPATIENT
Start: 2024-10-22

## 2024-10-22 NOTE — TELEPHONE ENCOUNTER
Meloxicam 15 mg  DOS: N/A  Last OV: 08/15/24  Last refill date: 09/23/24     #/refills: 30/0  Upcoming appt:   Future Appointments   Date Time Provider Department Center   12/2/2024  7:30 AM Shannon Salinas MD [id number]     07/09/24  BUN  9 - 23 mg/dL 21   Creatinine  0.55 - 1.02 mg/dL 0.93     eGFR-Cr  >=60 mL/min/1.73m2 63      Patient's mother Nguyen called back.  Informed of labs being consistent with PCOS.  Dicussed recommendation of Provera for 10 days with expectation of withdrawal bleed in 7-10 days.  Aware to return call once this happens to discuss further options.  Patient's mother verbalized understanding of recommendations and is in agreement with POC.  Has no additional concerns or questions at this time.

## 2024-11-04 DIAGNOSIS — Z79.899 ENCOUNTER FOR MONITORING STATIN THERAPY: ICD-10-CM

## 2024-11-04 DIAGNOSIS — Z51.81 ENCOUNTER FOR MONITORING STATIN THERAPY: ICD-10-CM

## 2024-11-04 DIAGNOSIS — I65.23 BILATERAL CAROTID ARTERY STENOSIS: ICD-10-CM

## 2024-11-04 RX ORDER — ROSUVASTATIN CALCIUM 10 MG/1
10 TABLET, COATED ORAL NIGHTLY
Qty: 90 TABLET | Refills: 1 | Status: SHIPPED | OUTPATIENT
Start: 2024-11-04

## 2024-11-04 NOTE — TELEPHONE ENCOUNTER
Last office visit: 7/9/24  Next office visit: 12/2/24  Last Refill: 10/12/23    Requested Prescriptions     Pending Prescriptions Disp Refills    rosuvastatin 10 MG Oral Tab 90 tablet 10     Sig: Take 1 tablet (10 mg total) by mouth nightly.

## 2024-11-18 ENCOUNTER — PATIENT MESSAGE (OUTPATIENT)
Dept: FAMILY MEDICINE CLINIC | Facility: CLINIC | Age: 79
End: 2024-11-18

## 2024-11-18 DIAGNOSIS — I10 BENIGN ESSENTIAL HYPERTENSION: Primary | ICD-10-CM

## 2024-11-18 DIAGNOSIS — R79.89 ELEVATED TSH: ICD-10-CM

## 2024-11-18 DIAGNOSIS — E78.5 HYPERLIPIDEMIA LDL GOAL <70: ICD-10-CM

## 2024-11-20 ENCOUNTER — LAB ENCOUNTER (OUTPATIENT)
Dept: LAB | Age: 79
End: 2024-11-20
Attending: STUDENT IN AN ORGANIZED HEALTH CARE EDUCATION/TRAINING PROGRAM
Payer: MEDICARE

## 2024-11-20 DIAGNOSIS — R53.83 OTHER FATIGUE: ICD-10-CM

## 2024-11-20 DIAGNOSIS — R79.89 ELEVATED TSH: ICD-10-CM

## 2024-11-20 DIAGNOSIS — E78.5 HYPERLIPIDEMIA LDL GOAL <70: ICD-10-CM

## 2024-11-20 DIAGNOSIS — I10 BENIGN ESSENTIAL HYPERTENSION: ICD-10-CM

## 2024-11-20 LAB
ALBUMIN SERPL-MCNC: 4.5 G/DL (ref 3.2–4.8)
ALBUMIN/GLOB SERPL: 1.7 {RATIO} (ref 1–2)
ALP LIVER SERPL-CCNC: 61 U/L
ALT SERPL-CCNC: 22 U/L
ANION GAP SERPL CALC-SCNC: 8 MMOL/L (ref 0–18)
AST SERPL-CCNC: 27 U/L (ref ?–34)
BASOPHILS # BLD AUTO: 0.02 X10(3) UL (ref 0–0.2)
BASOPHILS NFR BLD AUTO: 0.3 %
BILIRUB SERPL-MCNC: 0.8 MG/DL (ref 0.2–1.1)
BILIRUB UR QL STRIP.AUTO: NEGATIVE
BUN BLD-MCNC: 20 MG/DL (ref 9–23)
CALCIUM BLD-MCNC: 10.5 MG/DL (ref 8.7–10.4)
CHLORIDE SERPL-SCNC: 102 MMOL/L (ref 98–112)
CHOLEST SERPL-MCNC: 144 MG/DL (ref ?–200)
CLARITY UR REFRACT.AUTO: CLEAR
CO2 SERPL-SCNC: 30 MMOL/L (ref 21–32)
CREAT BLD-MCNC: 0.93 MG/DL
EGFRCR SERPLBLD CKD-EPI 2021: 63 ML/MIN/1.73M2 (ref 60–?)
EOSINOPHIL # BLD AUTO: 0.22 X10(3) UL (ref 0–0.7)
EOSINOPHIL NFR BLD AUTO: 3.4 %
ERYTHROCYTE [DISTWIDTH] IN BLOOD BY AUTOMATED COUNT: 12.8 %
FASTING PATIENT LIPID ANSWER: YES
FASTING STATUS PATIENT QL REPORTED: YES
GLOBULIN PLAS-MCNC: 2.6 G/DL (ref 2–3.5)
GLUCOSE BLD-MCNC: 94 MG/DL (ref 70–99)
GLUCOSE UR STRIP.AUTO-MCNC: NORMAL MG/DL
HCT VFR BLD AUTO: 39.9 %
HDLC SERPL-MCNC: 60 MG/DL (ref 40–59)
HGB BLD-MCNC: 13.3 G/DL
IMM GRANULOCYTES # BLD AUTO: 0.02 X10(3) UL (ref 0–1)
IMM GRANULOCYTES NFR BLD: 0.3 %
KETONES UR STRIP.AUTO-MCNC: NEGATIVE MG/DL
LDLC SERPL CALC-MCNC: 67 MG/DL (ref ?–100)
LEUKOCYTE ESTERASE UR QL STRIP.AUTO: NEGATIVE
LYMPHOCYTES # BLD AUTO: 1.31 X10(3) UL (ref 1–4)
LYMPHOCYTES NFR BLD AUTO: 20 %
MCH RBC QN AUTO: 30.6 PG (ref 26–34)
MCHC RBC AUTO-ENTMCNC: 33.3 G/DL (ref 31–37)
MCV RBC AUTO: 91.9 FL
MONOCYTES # BLD AUTO: 0.52 X10(3) UL (ref 0.1–1)
MONOCYTES NFR BLD AUTO: 7.9 %
NEUTROPHILS # BLD AUTO: 4.47 X10 (3) UL (ref 1.5–7.7)
NEUTROPHILS # BLD AUTO: 4.47 X10(3) UL (ref 1.5–7.7)
NEUTROPHILS NFR BLD AUTO: 68.1 %
NITRITE UR QL STRIP.AUTO: NEGATIVE
NONHDLC SERPL-MCNC: 84 MG/DL (ref ?–130)
OSMOLALITY SERPL CALC.SUM OF ELEC: 292 MOSM/KG (ref 275–295)
PH UR STRIP.AUTO: 7.5 [PH] (ref 5–8)
PLATELET # BLD AUTO: 314 10(3)UL (ref 150–450)
POTASSIUM SERPL-SCNC: 4.2 MMOL/L (ref 3.5–5.1)
PROT SERPL-MCNC: 7.1 G/DL (ref 5.7–8.2)
PROT UR STRIP.AUTO-MCNC: NEGATIVE MG/DL
RBC # BLD AUTO: 4.34 X10(6)UL
RBC UR QL AUTO: NEGATIVE
SODIUM SERPL-SCNC: 140 MMOL/L (ref 136–145)
SP GR UR STRIP.AUTO: 1.01 (ref 1–1.03)
T4 FREE SERPL-MCNC: 1.2 NG/DL (ref 0.8–1.7)
THYROGLOB SERPL-MCNC: <15 U/ML (ref ?–60)
TRIGL SERPL-MCNC: 94 MG/DL (ref 30–149)
TSI SER-ACNC: 3.31 UIU/ML (ref 0.55–4.78)
UROBILINOGEN UR STRIP.AUTO-MCNC: NORMAL MG/DL
VLDLC SERPL CALC-MCNC: 14 MG/DL (ref 0–30)
WBC # BLD AUTO: 6.6 X10(3) UL (ref 4–11)

## 2024-11-20 PROCEDURE — 80061 LIPID PANEL: CPT

## 2024-11-20 PROCEDURE — 86800 THYROGLOBULIN ANTIBODY: CPT

## 2024-11-20 PROCEDURE — 36415 COLL VENOUS BLD VENIPUNCTURE: CPT

## 2024-11-20 PROCEDURE — 84439 ASSAY OF FREE THYROXINE: CPT

## 2024-11-20 PROCEDURE — 80053 COMPREHEN METABOLIC PANEL: CPT

## 2024-11-20 PROCEDURE — 81003 URINALYSIS AUTO W/O SCOPE: CPT

## 2024-11-20 PROCEDURE — 84443 ASSAY THYROID STIM HORMONE: CPT

## 2024-11-20 PROCEDURE — 85025 COMPLETE CBC W/AUTO DIFF WBC: CPT

## 2024-12-02 ENCOUNTER — OFFICE VISIT (OUTPATIENT)
Dept: FAMILY MEDICINE CLINIC | Facility: CLINIC | Age: 79
End: 2024-12-02
Payer: MEDICARE

## 2024-12-02 VITALS
TEMPERATURE: 97 F | BODY MASS INDEX: 33.46 KG/M2 | DIASTOLIC BLOOD PRESSURE: 68 MMHG | HEIGHT: 66 IN | SYSTOLIC BLOOD PRESSURE: 130 MMHG | HEART RATE: 67 BPM | RESPIRATION RATE: 16 BRPM | WEIGHT: 208.19 LBS

## 2024-12-02 DIAGNOSIS — Z51.81 ENCOUNTER FOR MONITORING STATIN THERAPY: ICD-10-CM

## 2024-12-02 DIAGNOSIS — Z79.899 ENCOUNTER FOR MONITORING STATIN THERAPY: ICD-10-CM

## 2024-12-02 DIAGNOSIS — I10 BENIGN ESSENTIAL HYPERTENSION: ICD-10-CM

## 2024-12-02 DIAGNOSIS — E78.5 HYPERLIPIDEMIA LDL GOAL <70: ICD-10-CM

## 2024-12-02 DIAGNOSIS — Z11.59 NEED FOR HEPATITIS C SCREENING TEST: ICD-10-CM

## 2024-12-02 DIAGNOSIS — Z00.00 ENCOUNTER FOR MEDICARE ANNUAL WELLNESS EXAM: Primary | ICD-10-CM

## 2024-12-02 DIAGNOSIS — Z12.31 ENCOUNTER FOR SCREENING MAMMOGRAM FOR MALIGNANT NEOPLASM OF BREAST: ICD-10-CM

## 2024-12-02 DIAGNOSIS — I65.23 BILATERAL CAROTID ARTERY STENOSIS: ICD-10-CM

## 2024-12-02 DIAGNOSIS — F41.1 ANXIETY STATE: ICD-10-CM

## 2024-12-02 DIAGNOSIS — M17.0 BILATERAL PRIMARY OSTEOARTHRITIS OF KNEE: ICD-10-CM

## 2024-12-02 RX ORDER — ROSUVASTATIN CALCIUM 10 MG/1
10 TABLET, COATED ORAL NIGHTLY
Qty: 90 TABLET | Refills: 1 | Status: SHIPPED | OUTPATIENT
Start: 2024-12-02

## 2024-12-02 RX ORDER — HYDROCHLOROTHIAZIDE 25 MG/1
25 TABLET ORAL DAILY
Qty: 90 TABLET | Refills: 1 | Status: SHIPPED | OUTPATIENT
Start: 2024-12-02

## 2024-12-02 RX ORDER — FUROSEMIDE 20 MG/1
20 TABLET ORAL DAILY PRN
Qty: 90 TABLET | Refills: 1 | Status: SHIPPED | OUTPATIENT
Start: 2024-12-02

## 2024-12-02 RX ORDER — ESCITALOPRAM OXALATE 10 MG/1
10 TABLET ORAL DAILY
Qty: 90 TABLET | Refills: 1 | Status: SHIPPED | OUTPATIENT
Start: 2024-12-02

## 2024-12-02 RX ORDER — BISOPROLOL FUMARATE 5 MG/1
TABLET, FILM COATED ORAL
Qty: 90 TABLET | Refills: 1 | Status: SHIPPED | OUTPATIENT
Start: 2024-12-02

## 2024-12-02 NOTE — PATIENT INSTRUCTIONS
Refill policies:      Allow 3 business days for refills; controlled substances may take longer.  Contact your pharmacy at least 5-7 business days prior to running out of medication and have them send an electronic request or submit through the \"request refill\" option thru your [employer] account. No need to do both, as multiple requests will create an automated [employer] message to notify of a denial for one of the duplicated requests, causing you undue confusion.   Refills are NOT addressed on weekends; covering physicians do not authorize routine medications on weekends.  No narcotics or controlled substances are refilled after noon on Fridays or by on call physicians.  By law, narcotics cannot be faxed or phoned into your pharmacy.  If your prescription is due for a refill, you may be due for a follow up appointment. Please call our office at [phone] to make an appointment or schedule an appointment via [employer].  To best provide you care, patients receiving routine medications need to be seen at least twice a year. Patients receiving narcotic/controlled substance medications need to be seen at least once every 3 months.  In the event that your preferred pharmacy does not have the requested medication in stock (ie Backordered), it is your responsibility to find another pharmacy that has the requested medication available. We will gladly send a new prescription to that pharmacy at your request.  controlled substances may not be able to be filled out of state due to license restrictions.  If you have a planned trip, it's best to call your pharmacy at least 5-7 business days to prevent any delays in your medication refill.    Scheduling Tests:    If your physician has ordered radiology tests such as MRI or CT scans, please contact Central Scheduling at [phone] right away to schedule the test.  Once scheduled, the [facility] Centralized Referral Team will work with your insurance carrier to obtain pre-certification or  prior authorization.  Depending on your insurance carrier, approval may take 3-10 days.  It is highly recommended patients assure they have received an authorization before having a test performed.  If test is done without insurance authorization, patient may be responsible for the entire amount billed.      Precertification and Prior Authorizations:  If your physician has recommended that you have a procedure or additional testing performed the [facility] Centralized Referral Team will contact your insurance carrier to obtain pre-certification or prior authorization.    You are strongly encouraged to contact your insurance carrier to verify that your procedure/test has been approved and is a COVERED benefit.  Although the [facility] Centralized Referral Team does its due diligence, the insurance carrier gives the disclaimer that \"Although the procedure is authorized, this does not guarantee payment.\"    Ultimately the patient is responsible for payment.   Thank you for your understanding in this matter.  Paperwork Completion:  If you require FMLA or disability paperwork for your recovery, please make sure to either drop it off or have it faxed to our office at [phone]. Be sure the form has your name and date of birth on it.  The form will be faxed to our Forms Department and they will complete it for you.  There is a 25$ fee for all forms that need to be filled out.  Please be aware there is a 10-14 day turnaround time.  You will need to sign a release of information (DUY) form if your paperwork does not come with one.  You may call the Forms Department with any questions at [phone].  Their fax number is [phone].

## 2024-12-02 NOTE — PROGRESS NOTES
Subjective:   Adelina Thomas is a 79 year old female who presents for a Medicare Subsequent Annual Wellness visit (Pt already had Initial Annual Wellness) and scheduled follow up of multiple significant but stable problems.     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  Immunizations: got flu shot and covid vaccine  Screenings: dexa 2021 normal    Patient hast OA both knees. Had injection a few months ago. Taking meloxicam 15mg daily.     Will have a protein shake with kale as well.     Patient presents for recheck of her hypertension. Pt has been taking medications as instructed, no medication side effects.  BP Meds: bisoprolol Tabs - 5 MG; furosemide Tabs - 20 MG as needed for BLE edema; hydroCHLOROthiazide Tabs - 25 MG   Last Cr was 0.93 done on 11/20/2024.Last eGFR was 63 on 11/20/2024.    Patient presents for follow up of hyperlipidemia. Patient has been taking medications as prescribed, no muscle aches noted. Working on diet and exercise.   Cholesterol Meds: rosuvastatin Tabs - 10 MG   Cholesterol: 144, done on 11/20/2024.  HDL Cholesterol: 60, done on 11/20/2024.  LDL Cholesterol: 67, done on 11/20/2024.  TriGlycerides 94, done on 11/20/2024.  US carotid doppler 7/31/24:   There is calcified and noncalcified smooth plaque identified within carotid bulbs and proximal internal and proximal external carotid arteries bilaterally.   Impression   CONCLUSION:  There is less than 50% stenosis noted bilaterally within the carotid bulbs and proximal internal and external carotid  arteries.  No hemodynamically significant stenosis is identified.  There is antegrade flow within both vertebral arteries.   Patient's mother had plaque in the carotids.     Patient presents for follow up of anxiety and depression. Taking escitalopram 10mg daily. Doing well. Would like to continue current regimen.     Wt Readings from Last 6 Encounters:   12/02/24 208 lb 3.2 oz (94.4 kg)   08/15/24 205 lb (93 kg)   08/01/24 205 lb (93 kg)   07/09/24 205 lb 6.4 oz (93.2 kg)   01/11/24 204 lb (92.5 kg)   12/05/23 204 lb (92.5 kg)     History/Other:   Fall Risk Assessment:   She has been screened for Falls and is low risk.      Cognitive Assessment:   She had a completely normal cognitive assessment - see flowsheet entries     Functional Ability/Status:   Adelina Thomas has a completely normal functional assessment. See flowsheet for details.    Depression Screening (PHQ):  PHQ-2 SCORE: 0  , done 12/2/2024     Advanced Directives:   She does have a Living Will but we do NOT have it on file in Epic.    She does have a POA but we do NOT have it on file in [employer].    Patient has Advance Care Planning documents but we do not have a copy in EMR. Discussed Advanced Care Planning with patient and instructed patient to get our office a copy to be scanned into EMR.      Patient Active Problem List   Diagnosis    Depressive disorder, not elsewhere classified    Anxiety state    Hyperlipidemia LDL goal <70    Vitamin D deficiency    Benign essential hypertension    Benign neoplasm of colon    Primary osteoarthritis of right knee    Spondylolisthesis at L4-L5 level    Rotator cuff tendonitis, right    Chronic phlebitis of superficial vein of left lower extremity    Bilateral carotid artery stenosis     Allergies:  She is allergic to zocor [simvastatin - high dose].    Current Medications:  Outpatient Medications Marked as Taking for the 12/2/24 encounter (Office Visit) with Shannon Salinas MD   Medication Sig    bisoprolol  5 MG Oral Tab TAKE 1 TABLET EVERY DAY    escitalopram 10 MG Oral Tab Take 1 tablet (10 mg total) by mouth daily.    hydroCHLOROthiazide 25 MG Oral Tab Take 1 tablet (25 mg total) by mouth daily.    rosuvastatin 10 MG Oral Tab Take 1 tablet (10 mg total) by mouth nightly.    furosemide 20 MG Oral Tab Take 1 tablet (20 mg total) by mouth daily as needed. Take only as needed for swelling.    MELOXICAM 15 MG Oral Tab TAKE 1 TABLET EVERY DAY    vitamin E 400 UNITS Oral Cap Take 1,000 Units by mouth daily.    Cholecalciferol 125 MCG (5000 UT) Oral Tab Take 1 tablet (5,000 Units total) by mouth daily. Takes every other day    Coenzyme Q10 (CO Q 10 OR) Take by mouth.    Vitamin B-12 250 MCG Oral Tab Take 1 tablet (250 mcg total) by mouth daily.    Multiple Vitamin (DAILY MULTIVITAMIN OR) Take  by mouth.     Medical History:  She  has a past medical history of Acute stress reaction (11/15/2012), Arthritis, Chronic phlebitis of superficial vein of left lower extremity (12/04/2017), History of colonoscopy (04/2002), Iron deficiency anemia, unspecified, Left leg swelling (12/04/2017), Osteoarthritis, Osteopenia (10/22/2013), and Right leg swelling (12/04/2017).  Surgical History:  She  has a past surgical history that includes hand/finger surgery unlisted (1975); colonoscopy (1/11/13); and kash biopsy stereo nodule 1 site right (cpt=19081) (2008).   Family History:  Her family history includes Diabetes in her mother; Other in her mother; Other (age of onset: 50) in her father; Ovarian Cancer in her maternal aunt.  Social History:  She  reports that she has never smoked. She has never used smokeless tobacco. She reports that she does not currently use alcohol. She reports that she does not use drugs.    Tobacco:  She has never smoked tobacco.    CAGE Alcohol Screen:   CAGE screening score of 0 on 11/27/2024, showing low risk of alcohol abuse.      Patient Care Team:  Shannon Salinas MD as PCP - General  Eric Elizabeth MD  (SURGERY, ORTHOPEDIC)    Review of Systems  GENERAL: feels well otherwise  SKIN: denies any unusual skin lesions  EYES: denies blurred vision or double vision  HEENT: denies nasal congestion, sinus pain or ST  LUNGS: denies shortness of breath with exertion  CARDIOVASCULAR: denies chest pain on exertion  GI: denies abdominal pain, denies heartburn  : denies dysuria, vaginal discharge or itching, no complaint of urinary incontinence   MUSCULOSKELETAL: denies back pain  NEURO: denies headaches  PSYCHE: denies depression or anxiety  HEMATOLOGIC: denies hx of anemia  ENDOCRINE: denies thyroid history  ALL/ASTHMA: + hx of allergy    Objective:   Physical Exam  General Appearance:  Alert, cooperative, no distress, appears stated age   Head:  Normocephalic, without obvious abnormality, atraumatic   Eyes:  PERRL, conjunctiva/corneas clear, EOM's intact both eyes   Ears:  Normal TM's and external ear canals, both ears   Nose: Nares normal, septum midline,mucosa normal, no drainage or sinus tenderness   Throat: Lips, mucosa, and tongue normal; teeth and gums normal   Neck: Supple, symmetrical, trachea midline, no adenopathy;  thyroid: not enlarged, symmetric, no tenderness/mass/nodules; no carotid bruit or JVD   Back:   Symmetric, no curvature, ROM normal   Lungs:   Clear to auscultation bilaterally, respirations unlabored   Heart:  Regular rate and rhythm, S1 and S2 normal, no murmur, rub, or gallop   Abdomen:   Soft, non-tender, bowel sounds active all four quadrants,  no masses, no organomegaly   Pelvic: Deferred   Extremities: Extremities normal, atraumatic, no cyanosis, 1+ edema BLE   Pulses: 2+ and symmetric   Skin: Skin color, texture, turgor normal, no rashes or lesions   Lymph nodes: Cervical, supraclavicular, and axillary nodes normal   Neurologic: Normal       /68   Pulse 67   Temp 97 °F (36.1 °C) (Temporal)   Resp 16   Ht 5' 6\" (1.676 m)   Wt 208 lb 3.2 oz (94.4 kg)   LMP 09/25/1998   BMI 33.60  kg/m²  Estimated body mass index is 33.6 kg/m² as calculated from the following:    Height as of this encounter: 5' 6\" (1.676 m).    Weight as of this encounter: 208 lb 3.2 oz (94.4 kg).    Medicare Hearing Assessment:   Hearing Screening    Time taken: 12/2/2024  7:36 AM  Screening Method: Finger Rub  Finger Rub Result: Pass               Assessment & Plan:   Adelina Thomas is a 79 year old female who presents for a Medicare Assessment.     1. Encounter for Medicare annual wellness exam (Primary)  2. Need for hepatitis C screening test  - discussed recommendation for Hepatitis C screening in patients born between 1945 to 1965, patient agreeable  -     HCV Antibody; Future; Expected date: 12/02/2024  3. Benign essential hypertension  Pt presents for follow up of HTN  - no red flag symptoms  - BP controlled  - continue current regimen  - RTC 6mo or sooner if needed  -     CBC With Differential With Platelet; Future; Expected date: 05/20/2025  -     Comp Metabolic Panel (14); Future; Expected date: 05/20/2025  -     TSH W Reflex To Free T4; Future; Expected date: 05/20/2025  -     Bisoprolol Fumarate; TAKE 1 TABLET EVERY DAY  Dispense: 90 tablet; Refill: 1  -     hydroCHLOROthiazide; Take 1 tablet (25 mg total) by mouth daily.  Dispense: 90 tablet; Refill: 1  -     Furosemide; Take 1 tablet (20 mg total) by mouth daily as needed. Take only as needed for swelling.  Dispense: 90 tablet; Refill: 1  4. Bilateral carotid artery stenosis  Stable, repeat US in 2026  Overview:  US carotid doppler 7/31/24:   There is calcified and noncalcified smooth plaque identified within carotid bulbs and proximal internal and proximal external carotid arteries bilaterally.   Impression   CONCLUSION:  There is less than 50% stenosis noted bilaterally within the carotid bulbs and proximal internal and external carotid arteries.  No hemodynamically significant stenosis is identified.  There is antegrade flow within both vertebral  arteries.     Orders:  -     Lipid Panel; Future; Expected date: 05/20/2025  -     Rosuvastatin Calcium; Take 1 tablet (10 mg total) by mouth nightly.  Dispense: 90 tablet; Refill: 1  5. Hyperlipidemia LDL goal <70  Patient presents for follow up of HLD  - no side effects of medications  - will continue current regimen  - low fat diet and exercise  - follow up in 6mo or sooner if needed  -     Comp Metabolic Panel (14); Future; Expected date: 05/20/2025  -     Lipid Panel; Future; Expected date: 05/20/2025  6. Anxiety state  Patient presents for follow up of anxiety  - doing well  - continue current regimen of lexapro 10mg daily  - follow up in 6mo or sooner if needed  -     Escitalopram Oxalate; Take 1 tablet (10 mg total) by mouth daily.  Dispense: 90 tablet; Refill: 1  7. Encounter for screening mammogram for malignant neoplasm of breast  -     [facility] ADRIAN 2D+3D SCREENING BILAT (CPT=77067/39589); Future; Expected date: 12/16/2024  8. Encounter for monitoring statin therapy  -     Rosuvastatin Calcium; Take 1 tablet (10 mg total) by mouth nightly.  Dispense: 90 tablet; Refill: 1  9. Bilateral primary OA knee  - following with ortho, appreciate evaluation and recommendations    The patient indicates understanding of these issues and agrees to the plan.  Reinforced healthy diet, lifestyle, and exercise.      Return in about 6 months (around 5/20/2025) for medication follow up, or sooner if needed.     Shannon Salinas MD, 12/2/2024     Supplementary Documentation:   General Health:  In the past six months, have you lost more than 10 pounds without trying?: (Patient-Rptd) 2 - No  Has your appetite been poor?: (Patient-Rptd) No  Type of Diet: (Patient-Rptd) Balanced  How does the patient maintain a good energy level?: (Patient-Rptd) Daily Walks  How would you describe your daily physical activity?: (Patient-Rptd) Moderate  How would you describe your current health state?: (Patient-Rptd) Good  How do you maintain  positive mental well-being?: (Patient-Rptd) Social Interaction;Puzzles;Games;Visiting Friends;Visiting Family  On a scale of 0 to 10, with 0 being no pain and 10 being severe pain, what is your pain level?: (Patient-Rptd) 0 - (None)  In the past six months, have you experienced urine leakage?: (Patient-Rptd) 0-No  At any time do you feel concerned for the safety/well-being of yourself and/or your children, in your home or elsewhere?: (Patient-Rptd) No  Have you had any immunizations at another office such as Influenza, Hepatitis B, Tetanus, or Pneumococcal?: (Patient-Rptd) Yes    Health Maintenance   Topic Date Due    Annual Depression Screening  01/01/2024    COVID-19 Vaccine (7 - 2024-25 season) 09/01/2024    Annual Physical  12/05/2024    Influenza Vaccine  Completed    DEXA Scan  Completed    Fall Risk Screening (Annual)  Completed    Pneumococcal Vaccine: 65+ Years  Completed    Zoster Vaccines  Completed    Colorectal Cancer Screening  Discontinued    Mammogram  Discontinued

## 2024-12-31 ENCOUNTER — HOSPITAL ENCOUNTER (OUTPATIENT)
Dept: MAMMOGRAPHY | Age: 79
Discharge: HOME | End: 2024-12-31
Attending: STUDENT IN AN ORGANIZED HEALTH CARE EDUCATION/TRAINING PROGRAM
Payer: MEDICARE

## 2024-12-31 DIAGNOSIS — Z12.31 ENCOUNTER FOR SCREENING MAMMOGRAM FOR MALIGNANT NEOPLASM OF BREAST: ICD-10-CM

## 2024-12-31 PROCEDURE — 77063 BREAST TOMOSYNTHESIS BI: CPT | Performed by: STUDENT IN AN ORGANIZED HEALTH CARE EDUCATION/TRAINING PROGRAM

## 2024-12-31 PROCEDURE — 77067 SCR MAMMO BI INCL CAD: CPT | Performed by: STUDENT IN AN ORGANIZED HEALTH CARE EDUCATION/TRAINING PROGRAM

## 2025-04-03 ENCOUNTER — TELEPHONE (OUTPATIENT)
Dept: ORTHOPEDICS CLINIC | Facility: CLINIC | Age: 80
End: 2025-04-03

## 2025-04-03 NOTE — TELEPHONE ENCOUNTER
Inform pt by phone that her appt with Nestor was cancelled because Nestor doesn't treat pt for arms. Offered appt with Constantino and Kelli. Pt said that she will check with daughter to see if she will be avail to bring her to appt. Pt said she'll call the office and schedule appt later.  Future Appointments   Date Time Provider Department Center   4/9/2025  9:20 AM Jo Brown PA-C [facility] [id number]   6/2/2025  8:30 AM Shannon Salinas MD [id number]

## 2025-04-03 NOTE — TELEPHONE ENCOUNTER
Noted that pt is scheduled to come in for right arm pain and trapezius muscle pain (at the back.)  Tried to reach pt at both numbers. Left voicemail on both lines, but could not leave detailed msg on either.  Sent mychart with follow up questions.    Goal for msg: to see if sounds more cervical spine related, and to evaluate if any xrays of shoulder or elbow needed. See mychart questions.    Note: If unrelated, might not be able to address both issues in same appointment.

## 2025-04-03 NOTE — TELEPHONE ENCOUNTER
Patient scheduled online for   Future Appointments   Date Time Provider Department Center   4/9/2025  9:20 AM Jo Brown PA-C [facility] [id number]   6/2/2025  8:30 AM Shannon Salinas MD [facility] [id number]     Please advise if this is something Jo will see for.

## 2025-04-03 NOTE — TELEPHONE ENCOUNTER
Spoke with pt.    Pt is scheduled with Jo on 4/9/25 for right upper arm, closer to shoulder, pain as well as right sided trapezius pain. Both started about 1 week ago, seems interrelated, comes intermittently, responds at least temporarily to OTC ibuprofen.    Denies shoulder or neck pain.    No known injury.

## 2025-05-10 DIAGNOSIS — F41.1 ANXIETY STATE: ICD-10-CM

## 2025-05-10 DIAGNOSIS — I10 BENIGN ESSENTIAL HYPERTENSION: ICD-10-CM

## 2025-05-14 RX ORDER — HYDROCHLOROTHIAZIDE 25 MG/1
25 TABLET ORAL DAILY
Qty: 90 TABLET | Refills: 1 | Status: SHIPPED | OUTPATIENT
Start: 2025-05-14

## 2025-05-14 RX ORDER — BISOPROLOL FUMARATE 5 MG/1
5 TABLET, FILM COATED ORAL DAILY
Qty: 90 TABLET | Refills: 1 | Status: SHIPPED | OUTPATIENT
Start: 2025-05-14

## 2025-05-14 RX ORDER — ESCITALOPRAM OXALATE 10 MG/1
10 TABLET ORAL DAILY
Qty: 90 TABLET | Refills: 1 | Status: SHIPPED | OUTPATIENT
Start: 2025-05-14

## 2025-05-14 NOTE — TELEPHONE ENCOUNTER
Should patient schedule med check with Jessica Mckinnon?     Last Office Visit: 12/2/24  Last Refill: 12/2/24  Return to Clinic: 6 months   Protocol: passed   NOV: 9/22/25    Requested Prescriptions     Pending Prescriptions Disp Refills    bisoprolol 5 MG Oral Tab [Pharmacy Med Name: Bisoprolol Fumarate Oral Tablet 5 MG] 90 tablet 0     Sig: TAKE 1 TABLET EVERY DAY    escitalopram 10 MG Oral Tab [Pharmacy Med Name: Escitalopram Oxalate Oral Tablet 10 MG] 90 tablet 0     Sig: TAKE 1 TABLET EVERY DAY    hydroCHLOROthiazide 25 MG Oral Tab [Pharmacy Med Name: hydroCHLOROthiazide Oral Tablet 25 MG] 90 tablet 0     Sig: TAKE 1 TABLET EVERY DAY       Please approve if appropriate.     Thank you!

## 2025-05-27 ENCOUNTER — LAB ENCOUNTER (OUTPATIENT)
Dept: LAB | Age: 80
End: 2025-05-27
Attending: STUDENT IN AN ORGANIZED HEALTH CARE EDUCATION/TRAINING PROGRAM
Payer: MEDICARE

## 2025-05-27 DIAGNOSIS — I65.23 BILATERAL CAROTID ARTERY STENOSIS: ICD-10-CM

## 2025-05-27 DIAGNOSIS — Z11.59 NEED FOR HEPATITIS C SCREENING TEST: ICD-10-CM

## 2025-05-27 DIAGNOSIS — I10 BENIGN ESSENTIAL HYPERTENSION: ICD-10-CM

## 2025-05-27 DIAGNOSIS — E78.5 HYPERLIPIDEMIA LDL GOAL <70: ICD-10-CM

## 2025-05-27 LAB
ALBUMIN SERPL-MCNC: 4.5 G/DL (ref 3.2–4.8)
ALBUMIN/GLOB SERPL: 1.9 {RATIO} (ref 1–2)
ALP LIVER SERPL-CCNC: 57 U/L (ref 55–142)
ALT SERPL-CCNC: 17 U/L (ref 10–49)
ANION GAP SERPL CALC-SCNC: 7 MMOL/L (ref 0–18)
AST SERPL-CCNC: 19 U/L (ref ?–34)
BASOPHILS # BLD AUTO: 0.02 X10(3) UL (ref 0–0.2)
BASOPHILS NFR BLD AUTO: 0.2 %
BILIRUB SERPL-MCNC: 0.7 MG/DL (ref 0.2–1.1)
BUN BLD-MCNC: 22 MG/DL (ref 9–23)
CALCIUM BLD-MCNC: 10.2 MG/DL (ref 8.7–10.6)
CHLORIDE SERPL-SCNC: 101 MMOL/L (ref 98–112)
CHOLEST SERPL-MCNC: 135 MG/DL (ref ?–200)
CO2 SERPL-SCNC: 30 MMOL/L (ref 21–32)
CREAT BLD-MCNC: 1.07 MG/DL (ref 0.55–1.02)
EGFRCR SERPLBLD CKD-EPI 2021: 53 ML/MIN/1.73M2 (ref 60–?)
EOSINOPHIL # BLD AUTO: 0.19 X10(3) UL (ref 0–0.7)
EOSINOPHIL NFR BLD AUTO: 1.9 %
ERYTHROCYTE [DISTWIDTH] IN BLOOD BY AUTOMATED COUNT: 13.9 %
FASTING PATIENT LIPID ANSWER: YES
FASTING STATUS PATIENT QL REPORTED: YES
GLOBULIN PLAS-MCNC: 2.4 G/DL (ref 2–3.5)
GLUCOSE BLD-MCNC: 91 MG/DL (ref 70–99)
HCT VFR BLD AUTO: 40.7 % (ref 35–48)
HCV AB SERPL QL IA: NONREACTIVE
HDLC SERPL-MCNC: 68 MG/DL (ref 40–59)
HGB BLD-MCNC: 13.3 G/DL (ref 12–16)
IMM GRANULOCYTES # BLD AUTO: 0.06 X10(3) UL (ref 0–1)
IMM GRANULOCYTES NFR BLD: 0.6 %
LDLC SERPL CALC-MCNC: 52 MG/DL (ref ?–100)
LYMPHOCYTES # BLD AUTO: 1.61 X10(3) UL (ref 1–4)
LYMPHOCYTES NFR BLD AUTO: 16.3 %
MCH RBC QN AUTO: 30.3 PG (ref 26–34)
MCHC RBC AUTO-ENTMCNC: 32.7 G/DL (ref 31–37)
MCV RBC AUTO: 92.7 FL (ref 80–100)
MONOCYTES # BLD AUTO: 0.74 X10(3) UL (ref 0.1–1)
MONOCYTES NFR BLD AUTO: 7.5 %
NEUTROPHILS # BLD AUTO: 7.27 X10 (3) UL (ref 1.5–7.7)
NEUTROPHILS # BLD AUTO: 7.27 X10(3) UL (ref 1.5–7.7)
NEUTROPHILS NFR BLD AUTO: 73.5 %
NONHDLC SERPL-MCNC: 67 MG/DL (ref ?–130)
OSMOLALITY SERPL CALC.SUM OF ELEC: 289 MOSM/KG (ref 275–295)
PLATELET # BLD AUTO: 258 10(3)UL (ref 150–450)
POTASSIUM SERPL-SCNC: 4.4 MMOL/L (ref 3.5–5.1)
PROT SERPL-MCNC: 6.9 G/DL (ref 5.7–8.2)
RBC # BLD AUTO: 4.39 X10(6)UL (ref 3.8–5.3)
SODIUM SERPL-SCNC: 138 MMOL/L (ref 136–145)
TRIGL SERPL-MCNC: 77 MG/DL (ref 30–149)
TSI SER-ACNC: 3.27 UIU/ML (ref 0.55–4.78)
VLDLC SERPL CALC-MCNC: 11 MG/DL (ref 0–30)
WBC # BLD AUTO: 9.9 X10(3) UL (ref 4–11)

## 2025-05-27 PROCEDURE — 84443 ASSAY THYROID STIM HORMONE: CPT

## 2025-05-27 PROCEDURE — 86803 HEPATITIS C AB TEST: CPT

## 2025-05-27 PROCEDURE — 85025 COMPLETE CBC W/AUTO DIFF WBC: CPT

## 2025-05-27 PROCEDURE — 80061 LIPID PANEL: CPT

## 2025-05-27 PROCEDURE — 36415 COLL VENOUS BLD VENIPUNCTURE: CPT

## 2025-05-27 PROCEDURE — 80053 COMPREHEN METABOLIC PANEL: CPT

## 2025-05-29 ENCOUNTER — PATIENT MESSAGE (OUTPATIENT)
Dept: FAMILY MEDICINE CLINIC | Facility: CLINIC | Age: 80
End: 2025-05-29

## 2025-05-29 DIAGNOSIS — N28.9 FUNCTION KIDNEY DECREASED: Primary | ICD-10-CM

## 2025-05-30 NOTE — TELEPHONE ENCOUNTER
Her GFR is only slightly low at 53.  This can fluctuate.  Recommend really good hydration and limit any NSAID use and repeat BMP in 1 month.  Kidney function is likely starting to decline given age.

## 2025-06-20 DIAGNOSIS — I65.23 BILATERAL CAROTID ARTERY STENOSIS: ICD-10-CM

## 2025-06-20 DIAGNOSIS — Z51.81 ENCOUNTER FOR MONITORING STATIN THERAPY: ICD-10-CM

## 2025-06-20 DIAGNOSIS — Z79.899 ENCOUNTER FOR MONITORING STATIN THERAPY: ICD-10-CM

## 2025-06-20 RX ORDER — ROSUVASTATIN CALCIUM 10 MG/1
10 TABLET, COATED ORAL NIGHTLY
Qty: 90 TABLET | Refills: 1 | Status: SHIPPED | OUTPATIENT
Start: 2025-06-20

## 2025-06-20 NOTE — TELEPHONE ENCOUNTER
Last Office Visit: 12/2/24  Next Office Visit: 9/22/25  Last Refill:12/2/24  Medication Quantity Refills Start End   rosuvastatin 10 MG Oral Tab 90 tablet 1 12/2/2024 --   Sig:   Take 1 tablet (10 mg total) by mouth nightly.         Please authorize if acceptable.   Thank you!

## 2025-07-07 ENCOUNTER — LAB ENCOUNTER (OUTPATIENT)
Dept: LAB | Age: 80
End: 2025-07-07
Attending: STUDENT IN AN ORGANIZED HEALTH CARE EDUCATION/TRAINING PROGRAM
Payer: MEDICARE

## 2025-07-07 DIAGNOSIS — N28.9 FUNCTION KIDNEY DECREASED: ICD-10-CM

## 2025-07-07 LAB
ANION GAP SERPL CALC-SCNC: 9 MMOL/L (ref 0–18)
BUN BLD-MCNC: 18 MG/DL (ref 9–23)
CALCIUM BLD-MCNC: 10 MG/DL (ref 8.7–10.6)
CHLORIDE SERPL-SCNC: 95 MMOL/L (ref 98–112)
CO2 SERPL-SCNC: 30 MMOL/L (ref 21–32)
CREAT BLD-MCNC: 0.96 MG/DL (ref 0.55–1.02)
EGFRCR SERPLBLD CKD-EPI 2021: 61 ML/MIN/1.73M2 (ref 60–?)
FASTING STATUS PATIENT QL REPORTED: YES
GLUCOSE BLD-MCNC: 101 MG/DL (ref 70–99)
OSMOLALITY SERPL CALC.SUM OF ELEC: 280 MOSM/KG (ref 275–295)
POTASSIUM SERPL-SCNC: 3.8 MMOL/L (ref 3.5–5.1)
SODIUM SERPL-SCNC: 134 MMOL/L (ref 136–145)

## 2025-07-07 PROCEDURE — 80048 BASIC METABOLIC PNL TOTAL CA: CPT

## 2025-07-07 PROCEDURE — 36415 COLL VENOUS BLD VENIPUNCTURE: CPT

## (undated) DIAGNOSIS — I10 BENIGN ESSENTIAL HYPERTENSION: ICD-10-CM

## (undated) DIAGNOSIS — E55.9 VITAMIN D DEFICIENCY: Primary | ICD-10-CM

## (undated) DIAGNOSIS — M25.562 PAIN IN BOTH KNEES, UNSPECIFIED CHRONICITY: Primary | ICD-10-CM

## (undated) DIAGNOSIS — F43.0 ACUTE STRESS REACTION: ICD-10-CM

## (undated) DIAGNOSIS — M25.561 PAIN IN BOTH KNEES, UNSPECIFIED CHRONICITY: Primary | ICD-10-CM

## (undated) DIAGNOSIS — F41.1 ANXIETY STATE: ICD-10-CM

## (undated) NOTE — MR AVS SNAPSHOT
[facility], [address] [id number] [id number]               Thank you for choosing us for your health care visit with Luz Liriano PA-C.   We are glad to serve you and happy to provide you with this vitamin E 100 UNITS Caps   Take 100 Units by mouth daily.                 Where to Get Your Medications      These medications were sent to [address] [place] [address], [facility] [phone], [address] [place] [address]

## (undated) NOTE — LETTER
05/31/19    This letter was written at the request of Jazmyne Orellana. Currently her goal weight is 155 which bring her BMI to less than 27.     Sincerely,        Basia Augustin PA-c

## (undated) NOTE — ED AVS SNAPSHOT
[facility] in [address] [place] [address] Drive,[address]    [address] [place] [address]    Phone:  [phone]    Fax:  [address]   MRN: [id number]    Department:  [facility] END   Date of Visit:  6/22/2017 may not be covered by your plan. Please contact your insurance company to determine coverage for follow-up care and referrals. [facility]  [address] [facility], [address] [place] [address]  [phone] [address]  [phone] prescription right away and begin taking the medication(s) as directed. If the Immediate Care Provider has read X-rays, these will be re-interpreted by a radiologist.  If there is a significant change in your reading, you will be contacted.  Please make Medicaid plans. To get signed up and covered, please call [phone] and ask to get set up for an insurance coverage that is in-network with [facility].         MyChart     Visit Ale[facility]  You can access your MyChart to more actively manage